# Patient Record
Sex: MALE | Race: OTHER | Employment: UNEMPLOYED | URBAN - NONMETROPOLITAN AREA
[De-identification: names, ages, dates, MRNs, and addresses within clinical notes are randomized per-mention and may not be internally consistent; named-entity substitution may affect disease eponyms.]

---

## 2018-07-10 ENCOUNTER — HOSPITAL ENCOUNTER (OUTPATIENT)
Dept: NON INVASIVE DIAGNOSTICS | Age: 48
Discharge: HOME | End: 2018-07-10
Payer: MEDICAID

## 2018-07-10 PROCEDURE — 93017 CV STRESS TEST TRACING ONLY: CPT

## 2018-08-13 ENCOUNTER — APPOINTMENT (OUTPATIENT)
Dept: NUCLEAR MEDICINE | Age: 48
End: 2018-08-13
Payer: MEDICAID

## 2018-08-13 ENCOUNTER — HOSPITAL ENCOUNTER (OUTPATIENT)
Dept: PULMONOLOGY | Age: 48
Discharge: HOME | End: 2018-08-13
Payer: MEDICAID

## 2018-08-13 ENCOUNTER — HOSPITAL ENCOUNTER (OUTPATIENT)
Dept: NON INVASIVE DIAGNOSTICS | Age: 48
Discharge: HOME | End: 2018-08-13
Payer: MEDICAID

## 2018-08-13 ENCOUNTER — APPOINTMENT (OUTPATIENT)
Dept: NON INVASIVE DIAGNOSTICS | Age: 48
End: 2018-08-13
Payer: MEDICAID

## 2018-08-13 LAB
LV EF: 60 %
LVEF MODALITY: NORMAL

## 2018-08-13 PROCEDURE — 94729 DIFFUSING CAPACITY: CPT | Performed by: INTERNAL MEDICINE

## 2018-08-13 PROCEDURE — 94060 EVALUATION OF WHEEZING: CPT

## 2018-08-13 PROCEDURE — 94060 EVALUATION OF WHEEZING: CPT | Performed by: INTERNAL MEDICINE

## 2018-08-13 PROCEDURE — 93306 TTE W/DOPPLER COMPLETE: CPT

## 2018-08-13 PROCEDURE — 6360000002 HC RX W HCPCS: Performed by: INTERNAL MEDICINE

## 2018-08-13 PROCEDURE — 94729 DIFFUSING CAPACITY: CPT

## 2018-08-13 PROCEDURE — 94726 PLETHYSMOGRAPHY LUNG VOLUMES: CPT

## 2018-08-13 RX ORDER — ALBUTEROL SULFATE 2.5 MG/3ML
2.5 SOLUTION RESPIRATORY (INHALATION) ONCE
Status: COMPLETED | OUTPATIENT
Start: 2018-08-13 | End: 2018-08-13

## 2018-08-13 RX ADMIN — ALBUTEROL SULFATE 2.5 MG: 2.5 SOLUTION RESPIRATORY (INHALATION) at 12:57

## 2019-08-27 ENCOUNTER — HOSPITAL ENCOUNTER (OUTPATIENT)
Age: 49
Discharge: HOME | End: 2019-08-29
Payer: MEDICAID

## 2019-08-27 ENCOUNTER — HOSPITAL ENCOUNTER (OUTPATIENT)
Dept: GENERAL RADIOLOGY | Age: 49
Discharge: HOME | End: 2019-08-29
Payer: MEDICAID

## 2019-08-27 DIAGNOSIS — M25.472 LEFT ANKLE SWELLING: ICD-10-CM

## 2019-08-27 DIAGNOSIS — M25.572 LEFT ANKLE PAIN, UNSPECIFIED CHRONICITY: ICD-10-CM

## 2019-08-27 PROCEDURE — 73610 X-RAY EXAM OF ANKLE: CPT

## 2022-12-14 ENCOUNTER — OFFICE VISIT (OUTPATIENT)
Dept: FAMILY MEDICINE CLINIC | Age: 52
End: 2022-12-14

## 2022-12-14 ENCOUNTER — HOSPITAL ENCOUNTER (OUTPATIENT)
Age: 52
Discharge: HOME | End: 2022-12-14
Payer: MEDICAID

## 2022-12-14 VITALS
SYSTOLIC BLOOD PRESSURE: 138 MMHG | HEIGHT: 66 IN | DIASTOLIC BLOOD PRESSURE: 92 MMHG | WEIGHT: 176.6 LBS | HEART RATE: 78 BPM | OXYGEN SATURATION: 97 % | RESPIRATION RATE: 20 BRPM | BODY MASS INDEX: 28.38 KG/M2

## 2022-12-14 DIAGNOSIS — E88.81 INSULIN RESISTANCE: ICD-10-CM

## 2022-12-14 DIAGNOSIS — Z53.20 COLON CANCER SCREENING DECLINED: ICD-10-CM

## 2022-12-14 DIAGNOSIS — E78.2 MIXED HYPERLIPIDEMIA: ICD-10-CM

## 2022-12-14 DIAGNOSIS — F51.01 PRIMARY INSOMNIA: ICD-10-CM

## 2022-12-14 DIAGNOSIS — I10 ESSENTIAL HYPERTENSION: Primary | ICD-10-CM

## 2022-12-14 DIAGNOSIS — K14.6 SORENESS OF TONGUE: ICD-10-CM

## 2022-12-14 DIAGNOSIS — I10 ESSENTIAL HYPERTENSION: ICD-10-CM

## 2022-12-14 DIAGNOSIS — R79.89 ELEVATED LFTS: ICD-10-CM

## 2022-12-14 DIAGNOSIS — Z00.00 GENERAL MEDICAL EXAM: ICD-10-CM

## 2022-12-14 DIAGNOSIS — R60.0 BILATERAL LOWER EXTREMITY EDEMA: ICD-10-CM

## 2022-12-14 PROCEDURE — 80061 LIPID PANEL: CPT

## 2022-12-14 PROCEDURE — 83036 HEMOGLOBIN GLYCOSYLATED A1C: CPT

## 2022-12-14 PROCEDURE — 80053 COMPREHEN METABOLIC PANEL: CPT

## 2022-12-14 PROCEDURE — 85025 COMPLETE CBC W/AUTO DIFF WBC: CPT

## 2022-12-14 PROCEDURE — 36415 COLL VENOUS BLD VENIPUNCTURE: CPT

## 2022-12-14 RX ORDER — ASPIRIN 81 MG/1
81 TABLET ORAL DAILY
COMMUNITY

## 2022-12-14 RX ORDER — LISINOPRIL 20 MG/1
20 TABLET ORAL DAILY
Qty: 30 TABLET | Refills: 5 | Status: SHIPPED | OUTPATIENT
Start: 2022-12-14

## 2022-12-14 RX ORDER — FUROSEMIDE 20 MG/1
20 TABLET ORAL DAILY
COMMUNITY

## 2022-12-14 RX ORDER — ATORVASTATIN CALCIUM 10 MG/1
10 TABLET, FILM COATED ORAL DAILY
COMMUNITY
End: 2022-12-14 | Stop reason: SDUPTHER

## 2022-12-14 RX ORDER — CHOLECALCIFEROL (VITAMIN D3) 125 MCG
5 CAPSULE ORAL NIGHTLY PRN
COMMUNITY

## 2022-12-14 RX ORDER — ATORVASTATIN CALCIUM 10 MG/1
10 TABLET, FILM COATED ORAL DAILY
Qty: 30 TABLET | Refills: 5 | Status: SHIPPED | OUTPATIENT
Start: 2022-12-14

## 2022-12-14 RX ORDER — CIMETIDINE 300 MG/1
300 TABLET, FILM COATED ORAL 4 TIMES DAILY
COMMUNITY

## 2022-12-14 SDOH — ECONOMIC STABILITY: FOOD INSECURITY: WITHIN THE PAST 12 MONTHS, THE FOOD YOU BOUGHT JUST DIDN'T LAST AND YOU DIDN'T HAVE MONEY TO GET MORE.: NEVER TRUE

## 2022-12-14 SDOH — ECONOMIC STABILITY: FOOD INSECURITY: WITHIN THE PAST 12 MONTHS, YOU WORRIED THAT YOUR FOOD WOULD RUN OUT BEFORE YOU GOT MONEY TO BUY MORE.: NEVER TRUE

## 2022-12-14 ASSESSMENT — ENCOUNTER SYMPTOMS
NAUSEA: 0
DIARRHEA: 0
VOMITING: 0
SINUS PAIN: 0
BACK PAIN: 0
WHEEZING: 0
SORE THROAT: 0
COUGH: 0
ABDOMINAL PAIN: 0

## 2022-12-14 ASSESSMENT — PATIENT HEALTH QUESTIONNAIRE - PHQ9
7. TROUBLE CONCENTRATING ON THINGS, SUCH AS READING THE NEWSPAPER OR WATCHING TELEVISION: 0
SUM OF ALL RESPONSES TO PHQ QUESTIONS 1-9: 0
5. POOR APPETITE OR OVEREATING: 0
SUM OF ALL RESPONSES TO PHQ QUESTIONS 1-9: 0
3. TROUBLE FALLING OR STAYING ASLEEP: 0
4. FEELING TIRED OR HAVING LITTLE ENERGY: 0
10. IF YOU CHECKED OFF ANY PROBLEMS, HOW DIFFICULT HAVE THESE PROBLEMS MADE IT FOR YOU TO DO YOUR WORK, TAKE CARE OF THINGS AT HOME, OR GET ALONG WITH OTHER PEOPLE: 0
8. MOVING OR SPEAKING SO SLOWLY THAT OTHER PEOPLE COULD HAVE NOTICED. OR THE OPPOSITE, BEING SO FIGETY OR RESTLESS THAT YOU HAVE BEEN MOVING AROUND A LOT MORE THAN USUAL: 0
2. FEELING DOWN, DEPRESSED OR HOPELESS: 0
9. THOUGHTS THAT YOU WOULD BE BETTER OFF DEAD, OR OF HURTING YOURSELF: 0
SUM OF ALL RESPONSES TO PHQ QUESTIONS 1-9: 0
SUM OF ALL RESPONSES TO PHQ QUESTIONS 1-9: 0
SUM OF ALL RESPONSES TO PHQ9 QUESTIONS 1 & 2: 0
1. LITTLE INTEREST OR PLEASURE IN DOING THINGS: 0
6. FEELING BAD ABOUT YOURSELF - OR THAT YOU ARE A FAILURE OR HAVE LET YOURSELF OR YOUR FAMILY DOWN: 0

## 2022-12-14 ASSESSMENT — SOCIAL DETERMINANTS OF HEALTH (SDOH): HOW HARD IS IT FOR YOU TO PAY FOR THE VERY BASICS LIKE FOOD, HOUSING, MEDICAL CARE, AND HEATING?: NOT HARD AT ALL

## 2022-12-14 NOTE — PROGRESS NOTES
HPI Notes    Name: Jackson Vann  : 1970         Chief Complaint:     Chief Complaint   Patient presents with    New Patient     Comes from [facility] office in [address] [place] [address] would like to establish new PCP    Hypertension    Cholesterol Problem    Oral Pain     States his tongue has been hurting denies any lesions        History of Present Illness:        HPI    This is a very nice 47year old man with PMH of hypertension, hyperlipidemia, insomnia presenting to establish care with new primary care provider. His former primary care provider was Mr. Kurt Re[facility] at [facility]. We will obtain records from that individual.    His hyperlipidemia is currently treated with atorvastatin 10 mg p.o. daily. Insomnia is currently treated with melatonin 5 mg p.o. nightly. Hypertension is currently treated utilizing Lasix 20 mg p.o. daily, which was also helping him treat his lower extremity edema. He had more or less began noticing severely hypertensive readings accidentally when he checked his blood pressure on his mother's device. He has since kept log of twice daily blood pressure readings and his quite grossly hypertensive for most of the day. He has no symptoms. Of note, recent laboratory studies are significant for elevated AST, ALT with the remainder of his liver function normal, as well as A1c of 6.3%. He has been lost approximately 30 pounds over the past 8 months and continues to improve on his diet to move towards a healthier weight for his height. One additional problem he wishes to discuss today is pain of his tongue. He describes this as a \"tickling soreness\"on both sides of the tongue. This has been ongoing for several months, and was actually treated with antibiotics from his former PCP. He does not notice any lesions on the tongue. He does smoke 3-4 cigarettes a day and this problem improved somewhat with changing brands of cigarettes.      I briefly discussed colon cancer screening with him today, including Cologuard as well as colon scope. He reports that he would prefer to screen only \"if there is a problem\". Past Medical History:     Past Medical History:   Diagnosis Date    Hyperlipidemia     Hypertension     Insomnia       Reviewed all health maintenance requirements and ordered appropriate tests  Health Maintenance Due   Topic Date Due    Lipids  Never done    Depression Screen  Never done    HIV screen  Never done    Hepatitis C screen  Never done    Diabetes screen  Never done    Colorectal Cancer Screen  Never done    Shingles vaccine (1 of 2) Never done    COVID-19 Vaccine (4 - Booster for Moderna series) 01/25/2022    Flu vaccine (1) 08/01/2022       Past Surgical History:     No past surgical history on file. Medications:       Prior to Admission medications    Medication Sig Start Date End Date Taking? Authorizing Provider   aspirin 81 MG EC tablet Take 81 mg by mouth daily   Yes Historical Provider, MD   cimetidine (TAGAMET) 300 MG tablet Take 300 mg by mouth 4 times daily   Yes Historical Provider, MD   furosemide (LASIX) 20 MG tablet Take 20 mg by mouth daily If needed   Yes Historical Provider, MD   melatonin 5 MG TABS tablet Take 5 mg by mouth nightly as needed   Yes Historical Provider, MD   lisinopril (PRINIVIL;ZESTRIL) 20 MG tablet Take 1 tablet by mouth daily 12/14/22  Yes Jamar Rowe DO   atorvastatin (LIPITOR) 10 MG tablet Take 1 tablet by mouth daily 12/14/22  Yes Jamar Rowe DO        Allergies:       Patient has no known allergies. Social History:     Tobacco:    reports that he has been smoking cigarettes. He has a 7.50 pack-year smoking history. He has never used smokeless tobacco.  Alcohol:      reports current alcohol use. Drug Use:  reports no history of drug use.     Family History:     Family History   Problem Relation Age of Onset    High Cholesterol Mother     Diabetes Mother     High Blood Pressure Mother     High Cholesterol Father     High Blood Pressure Father     Heart Attack Father        Review of Systems:       Review of Systems   Constitutional:  Negative for fever. HENT:  Negative for sinus pain, sneezing and sore throat. Tongue pain per HPI   Respiratory:  Negative for cough and wheezing. Cardiovascular:  Negative for chest pain. Gastrointestinal:  Negative for abdominal pain, diarrhea, nausea and vomiting. Musculoskeletal:  Negative for back pain. Skin:  Negative for rash. Hematological:  Negative for adenopathy. Psychiatric/Behavioral:  Negative for sleep disturbance. Physical Exam:     Vitals:  BP (!) 138/92 (Site: Left Upper Arm, Position: Sitting, Cuff Size: Medium Adult)   Pulse 78   Resp 20   Ht 5' 5.9\" (1.674 m)   Wt 176 lb 9.6 oz (80.1 kg)   SpO2 97%   BMI 28.59 kg/m²     Physical Exam  Vitals and nursing note reviewed. Constitutional:       General: He is not in acute distress. Appearance: Normal appearance. HENT:      Right Ear: Tympanic membrane, ear canal and external ear normal. There is no impacted cerumen. Left Ear: Tympanic membrane, ear canal and external ear normal. There is no impacted cerumen. Nose: Nose normal. No congestion. Mouth/Throat:      Mouth: Mucous membranes are moist.      Pharynx: Oropharynx is clear. No oropharyngeal exudate. Comments: Overall oral examination is the exception of discolored teeth, the left lateral aspect of his tongue near the tip has a small hypopigmented area visible in the photograph. This area burns when palpated with a cotton swab. Eyes:      General: No scleral icterus. Conjunctiva/sclera: Conjunctivae normal.      Pupils: Pupils are equal, round, and reactive to light. Cardiovascular:      Rate and Rhythm: Normal rate and regular rhythm. Pulses: Normal pulses. Heart sounds: Normal heart sounds. No murmur heard.   Pulmonary:      Effort: Pulmonary effort is normal. No respiratory distress. Breath sounds: Normal breath sounds. No wheezing or rhonchi. Abdominal:      General: Abdomen is flat. Bowel sounds are normal.      Palpations: Abdomen is soft. Tenderness: There is no abdominal tenderness. Musculoskeletal:         General: No swelling or tenderness. Normal range of motion. Cervical back: Normal range of motion and neck supple. No rigidity. No muscular tenderness. Lymphadenopathy:      Cervical: No cervical adenopathy. Skin:     General: Skin is warm and dry. Capillary Refill: Capillary refill takes less than 2 seconds. Neurological:      General: No focal deficit present. Mental Status: He is alert and oriented to person, place, and time. Mental status is at baseline. Psychiatric:         Mood and Affect: Mood normal.         Behavior: Behavior normal.         Thought Content: Thought content normal.               Data:     No results found for: NA, K, CL, CO2, BUN, CREATININE, GLUCOSE, PROT, LABALBU, BILITOT, ALKPHOS, AST, ALT  No results found for: WBC, RBC, HGB, HCT, MCV, MCH, MCHC, RDW, PLT, MPV  No results found for: TSH  No results found for: CHOL, LDL, HDL, PSA, LABA1C       Assessment & Plan      Diagnosis Orders   1. Essential hypertension  lisinopril (PRINIVIL;ZESTRIL) 20 MG tablet    Comprehensive Metabolic Panel    CBC with Auto Differential      2. Mixed hyperlipidemia  atorvastatin (LIPITOR) 10 MG tablet    Lipid Panel      3. Primary insomnia        4. Bilateral lower extremity edema        5. Elevated LFTs  Comprehensive Metabolic Panel      6. Soreness of tongue        7. Insulin resistance  Hemoglobin A1C      8. General medical exam        9. Colon cancer screening declined            Continue furosemide, reevaluate CMP, CBC, start lisinopril 20 mg p.o. daily. Continue to measure blood pressure twice daily, reevaluate in the office in 2 weeks. \   The patient I had a long discussion about starting lisinopril.   We discussed its mechanism of action, intended goals, adverse effects, as well as common side effects. They were able to verbalize understanding, and repeat plan back to me. 2.  Check lipid panel, continue atorvastatin. You. I would recommend that he use melatonin and valerian root extract. 4.  Continue furosemide. 5.  We will reevaluate with CMP today, likely due to hepatic steatosis given that he had until recently been obese. 6.  He is a light tobacco smoker, will continue to monitor for signs of oral cancer. There are no lesions which are particularly concerning today. This may be stomatitis or transient lingual papillitis. 7.  We will reevaluate hemoglobin A1c today. 9.  I would recommend that he undergo screening with Cologuard, or colon cancer and we will support him with whichever one he chooses when he is ready to undergo screening. Follow up in 2 weeks. Completed Refills   Requested Prescriptions     Signed Prescriptions Disp Refills    lisinopril (PRINIVIL;ZESTRIL) 20 MG tablet 30 tablet 5     Sig: Take 1 tablet by mouth daily    atorvastatin (LIPITOR) 10 MG tablet 30 tablet 5     Sig: Take 1 tablet by mouth daily     Return in about 2 weeks (around 12/28/2022) for HTN.   Orders Placed This Encounter   Medications    lisinopril (PRINIVIL;ZESTRIL) 20 MG tablet     Sig: Take 1 tablet by mouth daily     Dispense:  30 tablet     Refill:  5    atorvastatin (LIPITOR) 10 MG tablet     Sig: Take 1 tablet by mouth daily     Dispense:  30 tablet     Refill:  5     Orders Placed This Encounter   Procedures    Comprehensive Metabolic Panel     Standing Status:   Future     Number of Occurrences:   1     Standing Expiration Date:   12/14/2023    Lipid Panel     Standing Status:   Future     Number of Occurrences:   1     Standing Expiration Date:   12/14/2023     Order Specific Question:   Is Patient Fasting?/# of Hours     Answer:   yes/10    CBC with Auto Differential     Standing Status: Future     Number of Occurrences:   1     Standing Expiration Date:   12/14/2023    Hemoglobin A1C     Standing Status:   Future     Number of Occurrences:   1     Standing Expiration Date:   12/14/2023         Patient Instructions     SURVEY:    You may be receiving a survey from [employer] regarding your visit today. Please complete the survey to enable us to provide the highest quality of care to you and your family. If you cannot score us a very good on any question, please call the office to discuss how we could of made your experience a very good one. Thank you.       Clinical Care Team:     Dr. Mango Merrill, [facility]      ClericalTeam:     [id number] [place] [address]   Electronically signed by Bridgette Leyden, DO on 12/14/2022 at 11:22 AM           Completed Refills   Requested Prescriptions     Signed Prescriptions Disp Refills    lisinopril (PRINIVIL;ZESTRIL) 20 MG tablet 30 tablet 5     Sig: Take 1 tablet by mouth daily    atorvastatin (LIPITOR) 10 MG tablet 30 tablet 5     Sig: Take 1 tablet by mouth daily

## 2022-12-28 ENCOUNTER — OFFICE VISIT (OUTPATIENT)
Dept: FAMILY MEDICINE CLINIC | Age: 52
End: 2022-12-28
Payer: MEDICAID

## 2022-12-28 VITALS
RESPIRATION RATE: 20 BRPM | HEIGHT: 66 IN | BODY MASS INDEX: 28.19 KG/M2 | DIASTOLIC BLOOD PRESSURE: 80 MMHG | WEIGHT: 175.4 LBS | HEART RATE: 70 BPM | SYSTOLIC BLOOD PRESSURE: 110 MMHG | OXYGEN SATURATION: 98 %

## 2022-12-28 DIAGNOSIS — H57.89 REDNESS OF BOTH EYES: ICD-10-CM

## 2022-12-28 DIAGNOSIS — H04.123 DRY EYES, BILATERAL: ICD-10-CM

## 2022-12-28 DIAGNOSIS — H53.8 BLURRY VISION: ICD-10-CM

## 2022-12-28 DIAGNOSIS — E78.2 MIXED HYPERLIPIDEMIA: ICD-10-CM

## 2022-12-28 DIAGNOSIS — I10 ESSENTIAL HYPERTENSION: Primary | ICD-10-CM

## 2022-12-28 PROCEDURE — 3017F COLORECTAL CA SCREEN DOC REV: CPT | Performed by: STUDENT IN AN ORGANIZED HEALTH CARE EDUCATION/TRAINING PROGRAM

## 2022-12-28 PROCEDURE — 4004F PT TOBACCO SCREEN RCVD TLK: CPT | Performed by: STUDENT IN AN ORGANIZED HEALTH CARE EDUCATION/TRAINING PROGRAM

## 2022-12-28 PROCEDURE — G8484 FLU IMMUNIZE NO ADMIN: HCPCS | Performed by: STUDENT IN AN ORGANIZED HEALTH CARE EDUCATION/TRAINING PROGRAM

## 2022-12-28 PROCEDURE — 99213 OFFICE O/P EST LOW 20 MIN: CPT | Performed by: STUDENT IN AN ORGANIZED HEALTH CARE EDUCATION/TRAINING PROGRAM

## 2022-12-28 PROCEDURE — G8419 CALC BMI OUT NRM PARAM NOF/U: HCPCS | Performed by: STUDENT IN AN ORGANIZED HEALTH CARE EDUCATION/TRAINING PROGRAM

## 2022-12-28 PROCEDURE — 3074F SYST BP LT 130 MM HG: CPT | Performed by: STUDENT IN AN ORGANIZED HEALTH CARE EDUCATION/TRAINING PROGRAM

## 2022-12-28 PROCEDURE — G8427 DOCREV CUR MEDS BY ELIG CLIN: HCPCS | Performed by: STUDENT IN AN ORGANIZED HEALTH CARE EDUCATION/TRAINING PROGRAM

## 2022-12-28 PROCEDURE — 3079F DIAST BP 80-89 MM HG: CPT | Performed by: STUDENT IN AN ORGANIZED HEALTH CARE EDUCATION/TRAINING PROGRAM

## 2022-12-28 RX ORDER — AZELASTINE HYDROCHLORIDE 0.5 MG/ML
1 SOLUTION/ DROPS OPHTHALMIC 2 TIMES DAILY
Qty: 6 ML | Refills: 1 | Status: SHIPPED | OUTPATIENT
Start: 2022-12-28 | End: 2023-01-27

## 2022-12-28 RX ORDER — LISINOPRIL 20 MG/1
40 TABLET ORAL DAILY
Qty: 60 TABLET | Refills: 5 | Status: SHIPPED | OUTPATIENT
Start: 2022-12-28

## 2022-12-28 RX ORDER — ATORVASTATIN CALCIUM 10 MG/1
10 TABLET, FILM COATED ORAL DAILY
Qty: 30 TABLET | Refills: 5 | Status: SHIPPED | OUTPATIENT
Start: 2022-12-28

## 2022-12-28 RX ORDER — FUROSEMIDE 20 MG/1
20 TABLET ORAL DAILY
Qty: 60 TABLET | Refills: 5 | Status: SHIPPED | OUTPATIENT
Start: 2022-12-28

## 2022-12-28 ASSESSMENT — ENCOUNTER SYMPTOMS
EYE ITCHING: 1
VOMITING: 0
COUGH: 0
SORE THROAT: 0
DIARRHEA: 0
NAUSEA: 0
EYE REDNESS: 1
SINUS PAIN: 0
BACK PAIN: 0
WHEEZING: 0
ABDOMINAL PAIN: 0

## 2022-12-28 NOTE — PROGRESS NOTES
HPI Notes    Name: Juan M Greenfield  : 1970         Chief Complaint:     Chief Complaint   Patient presents with    Hypertension     2 week follow up HTN has been logging his BP states it is all over the place     Eye Problem     Red eyes having hard time seeing has been to the eye doctor states they just give him new glassses       History of Present Illness:      HPI    This is a very nice 47year old man presenting for reevaluation of HTN. He has measured BP BID x14 days and presents with his recorded values. He reports that \"it is all over the place\". He continues to be adherent to taking furosemide and lisinopril, the latter of which is causing no side effects or adverse effects. Additionally, he is reporting newer onset blurry vision and red eyes over the past 18 months. He did go to his optometrist (Dr. Radha Santana) about five months ago and they gave him new glasses which are very helpful in correcting his vision. His main question is why is his vision blurry without the glasses. His eyes feel constantly \"tired\" and \"heavy\". He sees no scintillating scotoma or other visual field defect. Past Medical History:     Past Medical History:   Diagnosis Date    Hyperlipidemia     Hypertension     Insomnia       Reviewed all health maintenance requirements and ordered appropriate tests  Health Maintenance Due   Topic Date Due    HIV screen  Never done    Hepatitis C screen  Never done    Colorectal Cancer Screen  Never done    Shingles vaccine (1 of 2) Never done    COVID-19 Vaccine (4 - Booster for Moderna series) 2022    Flu vaccine (1) 2022       Past Surgical History:     No past surgical history on file. Medications:       Prior to Admission medications    Medication Sig Start Date End Date Taking?  Authorizing Provider   lisinopril (PRINIVIL;ZESTRIL) 20 MG tablet Take 2 tablets by mouth daily 22  Yes Kael German DO   azelastine (OPTIVAR) 0.05 % ophthalmic solution Place 1 drop into both eyes 2 times daily 12/28/22 1/27/23 Yes Quilla Spatz Olewiler, DO   furosemide (LASIX) 20 MG tablet Take 1 tablet by mouth daily If needed 12/28/22  Yes Ja Robertson DO   atorvastatin (LIPITOR) 10 MG tablet Take 1 tablet by mouth daily 12/28/22  Yes Quilla Spatz Olewiler, DO   aspirin 81 MG EC tablet Take 81 mg by mouth daily   Yes Historical Provider, MD   cimetidine (TAGAMET) 300 MG tablet Take 300 mg by mouth 4 times daily   Yes Historical Provider, MD   melatonin 5 MG TABS tablet Take 5 mg by mouth nightly as needed   Yes Historical Provider, MD        Allergies:       Patient has no known allergies. Social History:     Tobacco:    reports that he has been smoking cigarettes. He has a 7.50 pack-year smoking history. He has never used smokeless tobacco.  Alcohol:      reports current alcohol use. Drug Use:  reports no history of drug use. Family History:     Family History   Problem Relation Age of Onset    High Cholesterol Mother     Diabetes Mother     High Blood Pressure Mother     High Cholesterol Father     High Blood Pressure Father     Heart Attack Father        Review of Systems:         Review of Systems   Constitutional:  Negative for fever. HENT:  Negative for sinus pain, sneezing and sore throat. Eyes:  Positive for redness, itching and visual disturbance. Respiratory:  Negative for cough and wheezing. Cardiovascular:  Negative for chest pain. Gastrointestinal:  Negative for abdominal pain, diarrhea, nausea and vomiting. Musculoskeletal:  Negative for back pain. Skin:  Negative for rash. Hematological:  Negative for adenopathy. Psychiatric/Behavioral:  Negative for sleep disturbance. Physical Exam:     Vitals:  /80 (Site: Right Upper Arm, Position: Sitting, Cuff Size: Medium Adult)   Pulse 70   Resp 20   Ht 5' 5.9\" (1.674 m)   Wt 175 lb 6.4 oz (79.6 kg)   SpO2 98%   BMI 28.40 kg/m²       Physical Exam  Vitals and nursing note reviewed. Constitutional:       General: He is not in acute distress. Appearance: Normal appearance. He is obese. Eyes:      Comments: Sclera injected bilaterally eyes appear dry   Cardiovascular:      Rate and Rhythm: Normal rate and regular rhythm. Pulses: Normal pulses. Heart sounds: Normal heart sounds. No murmur heard. Pulmonary:      Effort: Pulmonary effort is normal. No respiratory distress. Breath sounds: Normal breath sounds. No wheezing or rhonchi. Musculoskeletal:         General: No swelling or tenderness. Normal range of motion. Skin:     General: Skin is warm and dry. Capillary Refill: Capillary refill takes less than 2 seconds. Neurological:      General: No focal deficit present. Mental Status: He is alert and oriented to person, place, and time. Mental status is at baseline. Psychiatric:         Mood and Affect: Mood normal.         Behavior: Behavior normal.         Thought Content:  Thought content normal.       Data:     Lab Results   Component Value Date/Time     12/14/2022 09:26 AM    K 4.3 12/14/2022 09:26 AM    CL 96 12/14/2022 09:26 AM    CO2 24 12/14/2022 09:26 AM    BUN 4 12/14/2022 09:26 AM    CREATININE 0.64 12/14/2022 09:26 AM    GLUCOSE 93 12/14/2022 09:26 AM    PROT 7.1 12/14/2022 09:26 AM    LABALBU 4.5 12/14/2022 09:26 AM    BILITOT 0.6 12/14/2022 09:26 AM    ALKPHOS 70 12/14/2022 09:26 AM    AST 51 12/14/2022 09:26 AM    ALT 54 12/14/2022 09:26 AM     Lab Results   Component Value Date/Time    WBC 5.4 12/14/2022 09:26 AM    RBC 5.24 12/14/2022 09:26 AM    HGB 15.8 12/14/2022 09:26 AM    HCT 46.7 12/14/2022 09:26 AM    MCV 89.0 12/14/2022 09:26 AM    MCH 30.2 12/14/2022 09:26 AM    MCHC 34.0 12/14/2022 09:26 AM    RDW 15.9 12/14/2022 09:26 AM     12/14/2022 09:26 AM     No results found for: TSH  Lab Results   Component Value Date/Time    CHOL 145 12/14/2022 09:26 AM    HDL 71 12/14/2022 09:26 AM    LABA1C 5.8 12/14/2022 09:26 AM tablet by mouth daily     Dispense:  30 tablet     Refill:  5     No orders of the defined types were placed in this encounter. There are no Patient Instructions on file for this visit.     Electronically signed by Satinder Spears DO on 12/28/2022 at 8:54 AM           Completed Refills   Requested Prescriptions     Signed Prescriptions Disp Refills    lisinopril (PRINIVIL;ZESTRIL) 20 MG tablet 60 tablet 5     Sig: Take 2 tablets by mouth daily    azelastine (OPTIVAR) 0.05 % ophthalmic solution 6 mL 1     Sig: Place 1 drop into both eyes 2 times daily    furosemide (LASIX) 20 MG tablet 60 tablet 5     Sig: Take 1 tablet by mouth daily If needed    atorvastatin (LIPITOR) 10 MG tablet 30 tablet 5     Sig: Take 1 tablet by mouth daily

## 2023-02-07 ENCOUNTER — APPOINTMENT (OUTPATIENT)
Dept: GENERAL RADIOLOGY | Age: 53
DRG: 199 | End: 2023-02-07
Payer: MEDICAID

## 2023-02-07 ENCOUNTER — OFFICE VISIT (OUTPATIENT)
Dept: FAMILY MEDICINE CLINIC | Age: 53
End: 2023-02-07
Payer: MEDICAID

## 2023-02-07 ENCOUNTER — HOSPITAL ENCOUNTER (OUTPATIENT)
Age: 53
Discharge: HOME | DRG: 199 | End: 2023-02-07
Payer: MEDICAID

## 2023-02-07 ENCOUNTER — HOSPITAL ENCOUNTER (INPATIENT)
Age: 53
LOS: 1 days | Discharge: HOME | DRG: 199 | End: 2023-02-08
Attending: EMERGENCY MEDICINE | Admitting: INTERNAL MEDICINE
Payer: MEDICAID

## 2023-02-07 VITALS
SYSTOLIC BLOOD PRESSURE: 166 MMHG | BODY MASS INDEX: 28.36 KG/M2 | HEART RATE: 62 BPM | RESPIRATION RATE: 20 BRPM | HEIGHT: 65 IN | WEIGHT: 170.2 LBS | DIASTOLIC BLOOD PRESSURE: 98 MMHG

## 2023-02-07 DIAGNOSIS — R07.89 CHEST DISCOMFORT: ICD-10-CM

## 2023-02-07 DIAGNOSIS — I10 ESSENTIAL HYPERTENSION: ICD-10-CM

## 2023-02-07 DIAGNOSIS — H65.92 OME (OTITIS MEDIA WITH EFFUSION), LEFT: ICD-10-CM

## 2023-02-07 DIAGNOSIS — H53.8 BLURRY VISION: ICD-10-CM

## 2023-02-07 DIAGNOSIS — I16.0 HYPERTENSIVE URGENCY: ICD-10-CM

## 2023-02-07 DIAGNOSIS — H60.8X2 CHRONIC ECZEMATOUS OTITIS EXTERNA OF LEFT EAR: ICD-10-CM

## 2023-02-07 DIAGNOSIS — R07.9 CHEST PAIN, UNSPECIFIED TYPE: Primary | ICD-10-CM

## 2023-02-07 DIAGNOSIS — H92.02 LEFT EAR PAIN: Primary | ICD-10-CM

## 2023-02-07 DIAGNOSIS — F51.01 PRIMARY INSOMNIA: ICD-10-CM

## 2023-02-07 DIAGNOSIS — I10 PRIMARY HYPERTENSION: ICD-10-CM

## 2023-02-07 DIAGNOSIS — H93.8X2 EAR FULLNESS, LEFT: ICD-10-CM

## 2023-02-07 LAB
ABSOLUTE EOS #: 0.2 K/UL (ref 0–0.4)
ABSOLUTE LYMPH #: 0.8 K/UL (ref 1–4.8)
ABSOLUTE MONO #: 0.7 K/UL (ref 0–1)
ALBUMIN SERPL-MCNC: 4.5 G/DL (ref 3.5–5.2)
ALP SERPL-CCNC: 72 U/L (ref 40–129)
ALT SERPL-CCNC: 44 U/L (ref 5–41)
ANION GAP SERPL CALCULATED.3IONS-SCNC: 14 MMOL/L (ref 9–17)
ANION GAP SERPL CALCULATED.3IONS-SCNC: 14 MMOL/L (ref 9–17)
AST SERPL-CCNC: 51 U/L
BASOPHILS # BLD: 1 % (ref 0–2)
BASOPHILS ABSOLUTE: 0.1 K/UL (ref 0–0.2)
BILIRUB DIRECT SERPL-MCNC: 0.2 MG/DL
BILIRUB INDIRECT SERPL-MCNC: 0.4 MG/DL (ref 0–1)
BILIRUB SERPL-MCNC: 0.6 MG/DL (ref 0.3–1.2)
BUN SERPL-MCNC: 3 MG/DL (ref 6–20)
BUN SERPL-MCNC: 3 MG/DL (ref 6–20)
BUN/CREAT BLD: 5 (ref 9–20)
BUN/CREAT BLD: 5 (ref 9–20)
CALCIUM SERPL-MCNC: 9.1 MG/DL (ref 8.6–10.4)
CALCIUM SERPL-MCNC: 9.2 MG/DL (ref 8.6–10.4)
CHLORIDE SERPL-SCNC: 89 MMOL/L (ref 98–107)
CHLORIDE SERPL-SCNC: 93 MMOL/L (ref 98–107)
CO2 SERPL-SCNC: 21 MMOL/L (ref 20–31)
CO2 SERPL-SCNC: 23 MMOL/L (ref 20–31)
CREAT SERPL-MCNC: 0.58 MG/DL (ref 0.7–1.2)
CREAT SERPL-MCNC: 0.64 MG/DL (ref 0.7–1.2)
DIFFERENTIAL TYPE: YES
EOSINOPHILS RELATIVE PERCENT: 3 % (ref 0–5)
ETHANOL PERCENT: <0.01 %
ETHANOL: <10 MG/DL
GFR SERPL CREATININE-BSD FRML MDRD: >60 ML/MIN/1.73M2
GFR SERPL CREATININE-BSD FRML MDRD: >60 ML/MIN/1.73M2
GLUCOSE SERPL-MCNC: 101 MG/DL (ref 70–99)
GLUCOSE SERPL-MCNC: 99 MG/DL (ref 70–99)
HCT VFR BLD AUTO: 44.5 % (ref 41–53)
HGB BLD-MCNC: 15.4 G/DL (ref 13.5–17.5)
LYMPHOCYTES # BLD: 10 % (ref 13–44)
MAGNESIUM SERPL-MCNC: 2.2 MG/DL (ref 1.6–2.6)
MCH RBC QN AUTO: 30.9 PG (ref 26–34)
MCHC RBC AUTO-ENTMCNC: 34.6 G/DL (ref 31–37)
MCV RBC AUTO: 89.2 FL (ref 80–100)
MONOCYTES # BLD: 9 % (ref 5–9)
PDW BLD-RTO: 14.4 % (ref 12.1–15.2)
PLATELET # BLD AUTO: 201 K/UL (ref 140–450)
POTASSIUM SERPL-SCNC: 3.7 MMOL/L (ref 3.7–5.3)
POTASSIUM SERPL-SCNC: 4.1 MMOL/L (ref 3.7–5.3)
PROT SERPL-MCNC: 7.1 G/DL (ref 6.4–8.3)
RBC # BLD: 4.99 M/UL (ref 4.5–5.9)
SARS-COV-2 RDRP RESP QL NAA+PROBE: NOT DETECTED
SEG NEUTROPHILS: 77 % (ref 39–75)
SEGMENTED NEUTROPHILS ABSOLUTE COUNT: 5.7 K/UL (ref 2.1–6.5)
SODIUM SERPL-SCNC: 126 MMOL/L (ref 135–144)
SODIUM SERPL-SCNC: 128 MMOL/L (ref 135–144)
SPECIMEN DESCRIPTION: NORMAL
TROPONIN I SERPL DL<=0.01 NG/ML-MCNC: 37 NG/L (ref 0–22)
TROPONIN I SERPL DL<=0.01 NG/ML-MCNC: 37 NG/L (ref 0–22)
TROPONIN I SERPL DL<=0.01 NG/ML-MCNC: 55 NG/L (ref 0–22)
TROPONIN I SERPL DL<=0.01 NG/ML-MCNC: 58 NG/L (ref 0–22)
TSH SERPL-ACNC: 0.92 UIU/ML (ref 0.3–5)
WBC # BLD AUTO: 7.4 K/UL (ref 3.5–11)

## 2023-02-07 PROCEDURE — 3080F DIAST BP >= 90 MM HG: CPT | Performed by: STUDENT IN AN ORGANIZED HEALTH CARE EDUCATION/TRAINING PROGRAM

## 2023-02-07 PROCEDURE — G8427 DOCREV CUR MEDS BY ELIG CLIN: HCPCS | Performed by: STUDENT IN AN ORGANIZED HEALTH CARE EDUCATION/TRAINING PROGRAM

## 2023-02-07 PROCEDURE — 85025 COMPLETE CBC W/AUTO DIFF WBC: CPT

## 2023-02-07 PROCEDURE — 94761 N-INVAS EAR/PLS OXIMETRY MLT: CPT

## 2023-02-07 PROCEDURE — 80076 HEPATIC FUNCTION PANEL: CPT

## 2023-02-07 PROCEDURE — 87635 SARS-COV-2 COVID-19 AMP PRB: CPT

## 2023-02-07 PROCEDURE — 83835 ASSAY OF METANEPHRINES: CPT

## 2023-02-07 PROCEDURE — 6370000000 HC RX 637 (ALT 250 FOR IP): Performed by: INTERNAL MEDICINE

## 2023-02-07 PROCEDURE — 83735 ASSAY OF MAGNESIUM: CPT

## 2023-02-07 PROCEDURE — G0480 DRUG TEST DEF 1-7 CLASSES: HCPCS

## 2023-02-07 PROCEDURE — 4004F PT TOBACCO SCREEN RCVD TLK: CPT | Performed by: STUDENT IN AN ORGANIZED HEALTH CARE EDUCATION/TRAINING PROGRAM

## 2023-02-07 PROCEDURE — 82533 TOTAL CORTISOL: CPT

## 2023-02-07 PROCEDURE — 36415 COLL VENOUS BLD VENIPUNCTURE: CPT

## 2023-02-07 PROCEDURE — 6370000000 HC RX 637 (ALT 250 FOR IP): Performed by: EMERGENCY MEDICINE

## 2023-02-07 PROCEDURE — 84484 ASSAY OF TROPONIN QUANT: CPT

## 2023-02-07 PROCEDURE — C9803 HOPD COVID-19 SPEC COLLECT: HCPCS

## 2023-02-07 PROCEDURE — 2500000003 HC RX 250 WO HCPCS: Performed by: INTERNAL MEDICINE

## 2023-02-07 PROCEDURE — 1200000000 HC SEMI PRIVATE

## 2023-02-07 PROCEDURE — 82088 ASSAY OF ALDOSTERONE: CPT

## 2023-02-07 PROCEDURE — 99214 OFFICE O/P EST MOD 30 MIN: CPT | Performed by: STUDENT IN AN ORGANIZED HEALTH CARE EDUCATION/TRAINING PROGRAM

## 2023-02-07 PROCEDURE — 99285 EMERGENCY DEPT VISIT HI MDM: CPT

## 2023-02-07 PROCEDURE — 2580000003 HC RX 258: Performed by: INTERNAL MEDICINE

## 2023-02-07 PROCEDURE — 6360000002 HC RX W HCPCS: Performed by: INTERNAL MEDICINE

## 2023-02-07 PROCEDURE — 84244 ASSAY OF RENIN: CPT

## 2023-02-07 PROCEDURE — 93005 ELECTROCARDIOGRAM TRACING: CPT

## 2023-02-07 PROCEDURE — 71045 X-RAY EXAM CHEST 1 VIEW: CPT

## 2023-02-07 PROCEDURE — 3077F SYST BP >= 140 MM HG: CPT | Performed by: STUDENT IN AN ORGANIZED HEALTH CARE EDUCATION/TRAINING PROGRAM

## 2023-02-07 PROCEDURE — 84443 ASSAY THYROID STIM HORMONE: CPT

## 2023-02-07 PROCEDURE — 80048 BASIC METABOLIC PNL TOTAL CA: CPT

## 2023-02-07 PROCEDURE — G8484 FLU IMMUNIZE NO ADMIN: HCPCS | Performed by: STUDENT IN AN ORGANIZED HEALTH CARE EDUCATION/TRAINING PROGRAM

## 2023-02-07 PROCEDURE — 3017F COLORECTAL CA SCREEN DOC REV: CPT | Performed by: STUDENT IN AN ORGANIZED HEALTH CARE EDUCATION/TRAINING PROGRAM

## 2023-02-07 PROCEDURE — G8419 CALC BMI OUT NRM PARAM NOF/U: HCPCS | Performed by: STUDENT IN AN ORGANIZED HEALTH CARE EDUCATION/TRAINING PROGRAM

## 2023-02-07 PROCEDURE — 93005 ELECTROCARDIOGRAM TRACING: CPT | Performed by: EMERGENCY MEDICINE

## 2023-02-07 RX ORDER — ACETAMINOPHEN 325 MG/1
650 TABLET ORAL EVERY 6 HOURS PRN
Status: DISCONTINUED | OUTPATIENT
Start: 2023-02-07 | End: 2023-02-08 | Stop reason: HOSPADM

## 2023-02-07 RX ORDER — NITROGLYCERIN 0.4 MG/1
0.4 TABLET SUBLINGUAL EVERY 5 MIN PRN
OUTPATIENT
Start: 2023-02-07 | End: 2023-02-07

## 2023-02-07 RX ORDER — ATORVASTATIN CALCIUM 10 MG/1
10 TABLET, FILM COATED ORAL DAILY
Status: DISCONTINUED | OUTPATIENT
Start: 2023-02-07 | End: 2023-02-08 | Stop reason: HOSPADM

## 2023-02-07 RX ORDER — ACETAMINOPHEN 650 MG/1
650 SUPPOSITORY RECTAL EVERY 6 HOURS PRN
Status: DISCONTINUED | OUTPATIENT
Start: 2023-02-07 | End: 2023-02-08 | Stop reason: HOSPADM

## 2023-02-07 RX ORDER — HYDROCHLOROTHIAZIDE 25 MG/1
25 TABLET ORAL DAILY
Status: DISCONTINUED | OUTPATIENT
Start: 2023-02-07 | End: 2023-02-08 | Stop reason: HOSPADM

## 2023-02-07 RX ORDER — SODIUM CHLORIDE 0.9 % (FLUSH) 0.9 %
5-40 SYRINGE (ML) INJECTION PRN
Status: DISCONTINUED | OUTPATIENT
Start: 2023-02-07 | End: 2023-02-08 | Stop reason: HOSPADM

## 2023-02-07 RX ORDER — ENOXAPARIN SODIUM 100 MG/ML
40 INJECTION SUBCUTANEOUS DAILY
Status: DISCONTINUED | OUTPATIENT
Start: 2023-02-07 | End: 2023-02-08 | Stop reason: HOSPADM

## 2023-02-07 RX ORDER — DOXAZOSIN 2 MG/1
2 TABLET ORAL DAILY
Status: DISCONTINUED | OUTPATIENT
Start: 2023-02-07 | End: 2023-02-08 | Stop reason: HOSPADM

## 2023-02-07 RX ORDER — SODIUM CHLORIDE 0.9 % (FLUSH) 0.9 %
5-40 SYRINGE (ML) INJECTION EVERY 12 HOURS SCHEDULED
Status: DISCONTINUED | OUTPATIENT
Start: 2023-02-07 | End: 2023-02-08 | Stop reason: HOSPADM

## 2023-02-07 RX ORDER — ASPIRIN 81 MG/1
81 TABLET ORAL DAILY
Status: DISCONTINUED | OUTPATIENT
Start: 2023-02-07 | End: 2023-02-08 | Stop reason: HOSPADM

## 2023-02-07 RX ORDER — CLONIDINE HYDROCHLORIDE 0.1 MG/1
0.2 TABLET ORAL ONCE
Status: COMPLETED | OUTPATIENT
Start: 2023-02-07 | End: 2023-02-07

## 2023-02-07 RX ORDER — FAMOTIDINE 20 MG/1
20 TABLET, FILM COATED ORAL 2 TIMES DAILY
Status: DISCONTINUED | OUTPATIENT
Start: 2023-02-07 | End: 2023-02-08 | Stop reason: HOSPADM

## 2023-02-07 RX ORDER — POLYETHYLENE GLYCOL 3350 17 G/17G
17 POWDER, FOR SOLUTION ORAL DAILY PRN
Status: DISCONTINUED | OUTPATIENT
Start: 2023-02-07 | End: 2023-02-08 | Stop reason: HOSPADM

## 2023-02-07 RX ORDER — LISINOPRIL AND HYDROCHLOROTHIAZIDE 20; 12.5 MG/1; MG/1
2 TABLET ORAL DAILY
Status: DISCONTINUED | OUTPATIENT
Start: 2023-02-07 | End: 2023-02-07

## 2023-02-07 RX ORDER — ONDANSETRON 4 MG/1
4 TABLET, ORALLY DISINTEGRATING ORAL EVERY 8 HOURS PRN
Status: DISCONTINUED | OUTPATIENT
Start: 2023-02-07 | End: 2023-02-08 | Stop reason: HOSPADM

## 2023-02-07 RX ORDER — AMLODIPINE BESYLATE 10 MG/1
10 TABLET ORAL DAILY
Status: DISCONTINUED | OUTPATIENT
Start: 2023-02-07 | End: 2023-02-08

## 2023-02-07 RX ORDER — CHOLECALCIFEROL (VITAMIN D3) 125 MCG
5 CAPSULE ORAL NIGHTLY PRN
Status: DISCONTINUED | OUTPATIENT
Start: 2023-02-07 | End: 2023-02-08 | Stop reason: HOSPADM

## 2023-02-07 RX ORDER — AMOXICILLIN 500 MG/1
500 CAPSULE ORAL 2 TIMES DAILY
Qty: 14 CAPSULE | Refills: 0 | Status: SHIPPED | OUTPATIENT
Start: 2023-02-07 | End: 2023-02-14

## 2023-02-07 RX ORDER — FUROSEMIDE 20 MG/1
20 TABLET ORAL DAILY
Status: DISCONTINUED | OUTPATIENT
Start: 2023-02-07 | End: 2023-02-08

## 2023-02-07 RX ORDER — AMOXICILLIN 500 MG/1
500 CAPSULE ORAL 3 TIMES DAILY
Status: DISCONTINUED | OUTPATIENT
Start: 2023-02-07 | End: 2023-02-08 | Stop reason: HOSPADM

## 2023-02-07 RX ORDER — SODIUM CHLORIDE 9 MG/ML
500 INJECTION, SOLUTION INTRAVENOUS CONTINUOUS PRN
OUTPATIENT
Start: 2023-02-07 | End: 2023-02-07

## 2023-02-07 RX ORDER — ALBUTEROL SULFATE 90 UG/1
2 AEROSOL, METERED RESPIRATORY (INHALATION) PRN
OUTPATIENT
Start: 2023-02-07 | End: 2023-02-07

## 2023-02-07 RX ORDER — ONDANSETRON 2 MG/ML
4 INJECTION INTRAMUSCULAR; INTRAVENOUS EVERY 6 HOURS PRN
Status: DISCONTINUED | OUTPATIENT
Start: 2023-02-07 | End: 2023-02-08 | Stop reason: HOSPADM

## 2023-02-07 RX ORDER — METOPROLOL TARTRATE 50 MG/1
50 TABLET, FILM COATED ORAL 2 TIMES DAILY
Status: DISCONTINUED | OUTPATIENT
Start: 2023-02-07 | End: 2023-02-08

## 2023-02-07 RX ORDER — METOPROLOL TARTRATE 5 MG/5ML
5 INJECTION INTRAVENOUS EVERY 5 MIN PRN
OUTPATIENT
Start: 2023-02-07 | End: 2023-02-07

## 2023-02-07 RX ORDER — SODIUM CHLORIDE 9 MG/ML
INJECTION, SOLUTION INTRAVENOUS PRN
Status: DISCONTINUED | OUTPATIENT
Start: 2023-02-07 | End: 2023-02-08 | Stop reason: HOSPADM

## 2023-02-07 RX ORDER — ATROPINE SULFATE 0.1 MG/ML
0.5 INJECTION INTRAVENOUS EVERY 5 MIN PRN
OUTPATIENT
Start: 2023-02-07 | End: 2023-02-07

## 2023-02-07 RX ORDER — LISINOPRIL 20 MG/1
40 TABLET ORAL DAILY
Status: DISCONTINUED | OUTPATIENT
Start: 2023-02-07 | End: 2023-02-08 | Stop reason: HOSPADM

## 2023-02-07 RX ORDER — TRAZODONE HYDROCHLORIDE 50 MG/1
100 TABLET ORAL NIGHTLY PRN
Status: DISCONTINUED | OUTPATIENT
Start: 2023-02-07 | End: 2023-02-08 | Stop reason: HOSPADM

## 2023-02-07 RX ORDER — ASPIRIN 81 MG/1
81 TABLET, CHEWABLE ORAL DAILY
Status: DISCONTINUED | OUTPATIENT
Start: 2023-02-08 | End: 2023-02-08 | Stop reason: SDUPTHER

## 2023-02-07 RX ORDER — LABETALOL HYDROCHLORIDE 5 MG/ML
10 INJECTION, SOLUTION INTRAVENOUS EVERY 6 HOURS PRN
Status: DISCONTINUED | OUTPATIENT
Start: 2023-02-07 | End: 2023-02-08 | Stop reason: HOSPADM

## 2023-02-07 RX ORDER — AMLODIPINE BESYLATE 5 MG/1
5 TABLET ORAL DAILY
Qty: 30 TABLET | Refills: 5 | Status: ON HOLD | OUTPATIENT
Start: 2023-02-07 | End: 2023-02-08 | Stop reason: HOSPADM

## 2023-02-07 RX ORDER — LISINOPRIL AND HYDROCHLOROTHIAZIDE 20; 12.5 MG/1; MG/1
2 TABLET ORAL DAILY
Qty: 60 TABLET | Refills: 5 | Status: ON HOLD | OUTPATIENT
Start: 2023-02-07 | End: 2023-02-08 | Stop reason: SDUPTHER

## 2023-02-07 RX ADMIN — FAMOTIDINE 20 MG: 20 TABLET ORAL at 20:26

## 2023-02-07 RX ADMIN — AMLODIPINE BESYLATE 10 MG: 10 TABLET ORAL at 17:24

## 2023-02-07 RX ADMIN — ENOXAPARIN SODIUM 40 MG: 100 INJECTION SUBCUTANEOUS at 17:23

## 2023-02-07 RX ADMIN — AMOXICILLIN 500 MG: 500 CAPSULE ORAL at 20:26

## 2023-02-07 RX ADMIN — METOPROLOL TARTRATE 50 MG: 50 TABLET, FILM COATED ORAL at 20:27

## 2023-02-07 RX ADMIN — FOLIC ACID: 5 INJECTION, SOLUTION INTRAMUSCULAR; INTRAVENOUS; SUBCUTANEOUS at 16:11

## 2023-02-07 RX ADMIN — TRAZODONE HYDROCHLORIDE 100 MG: 50 TABLET ORAL at 21:49

## 2023-02-07 RX ADMIN — CLONIDINE HYDROCHLORIDE 0.2 MG: 0.1 TABLET ORAL at 13:06

## 2023-02-07 RX ADMIN — FUROSEMIDE 20 MG: 20 TABLET ORAL at 17:24

## 2023-02-07 RX ADMIN — ATORVASTATIN CALCIUM 10 MG: 10 TABLET, FILM COATED ORAL at 20:27

## 2023-02-07 RX ADMIN — DOXAZOSIN 2 MG: 2 TABLET ORAL at 17:23

## 2023-02-07 SDOH — ECONOMIC STABILITY: FOOD INSECURITY: WITHIN THE PAST 12 MONTHS, YOU WORRIED THAT YOUR FOOD WOULD RUN OUT BEFORE YOU GOT MONEY TO BUY MORE.: NEVER TRUE

## 2023-02-07 SDOH — ECONOMIC STABILITY: HOUSING INSECURITY
IN THE LAST 12 MONTHS, WAS THERE A TIME WHEN YOU DID NOT HAVE A STEADY PLACE TO SLEEP OR SLEPT IN A SHELTER (INCLUDING NOW)?: NO

## 2023-02-07 SDOH — ECONOMIC STABILITY: INCOME INSECURITY: HOW HARD IS IT FOR YOU TO PAY FOR THE VERY BASICS LIKE FOOD, HOUSING, MEDICAL CARE, AND HEATING?: NOT HARD AT ALL

## 2023-02-07 SDOH — ECONOMIC STABILITY: FOOD INSECURITY: WITHIN THE PAST 12 MONTHS, THE FOOD YOU BOUGHT JUST DIDN'T LAST AND YOU DIDN'T HAVE MONEY TO GET MORE.: NEVER TRUE

## 2023-02-07 ASSESSMENT — PAIN DESCRIPTION - LOCATION
LOCATION: CHEST
LOCATION: CHEST

## 2023-02-07 ASSESSMENT — PAIN DESCRIPTION - FREQUENCY: FREQUENCY: INTERMITTENT

## 2023-02-07 ASSESSMENT — ENCOUNTER SYMPTOMS
DIARRHEA: 0
BACK PAIN: 0
NAUSEA: 0
ABDOMINAL PAIN: 0
COUGH: 0
SINUS PAIN: 0
SORE THROAT: 0
VOMITING: 0
WHEEZING: 0

## 2023-02-07 ASSESSMENT — PAIN DESCRIPTION - ORIENTATION
ORIENTATION: MID
ORIENTATION: MID

## 2023-02-07 ASSESSMENT — PATIENT HEALTH QUESTIONNAIRE - PHQ9
SUM OF ALL RESPONSES TO PHQ QUESTIONS 1-9: 0
SUM OF ALL RESPONSES TO PHQ9 QUESTIONS 1 & 2: 0
10. IF YOU CHECKED OFF ANY PROBLEMS, HOW DIFFICULT HAVE THESE PROBLEMS MADE IT FOR YOU TO DO YOUR WORK, TAKE CARE OF THINGS AT HOME, OR GET ALONG WITH OTHER PEOPLE: 0
SUM OF ALL RESPONSES TO PHQ QUESTIONS 1-9: 0
7. TROUBLE CONCENTRATING ON THINGS, SUCH AS READING THE NEWSPAPER OR WATCHING TELEVISION: 0
SUM OF ALL RESPONSES TO PHQ QUESTIONS 1-9: 0
8. MOVING OR SPEAKING SO SLOWLY THAT OTHER PEOPLE COULD HAVE NOTICED. OR THE OPPOSITE, BEING SO FIGETY OR RESTLESS THAT YOU HAVE BEEN MOVING AROUND A LOT MORE THAN USUAL: 0
SUM OF ALL RESPONSES TO PHQ QUESTIONS 1-9: 0
6. FEELING BAD ABOUT YOURSELF - OR THAT YOU ARE A FAILURE OR HAVE LET YOURSELF OR YOUR FAMILY DOWN: 0
5. POOR APPETITE OR OVEREATING: 0
9. THOUGHTS THAT YOU WOULD BE BETTER OFF DEAD, OR OF HURTING YOURSELF: 0
2. FEELING DOWN, DEPRESSED OR HOPELESS: 0
3. TROUBLE FALLING OR STAYING ASLEEP: 0
4. FEELING TIRED OR HAVING LITTLE ENERGY: 0
1. LITTLE INTEREST OR PLEASURE IN DOING THINGS: 0

## 2023-02-07 ASSESSMENT — PAIN SCALES - GENERAL
PAINLEVEL_OUTOF10: 2
PAINLEVEL_OUTOF10: 4
PAINLEVEL_OUTOF10: 0
PAINLEVEL_OUTOF10: 0

## 2023-02-07 ASSESSMENT — PAIN DESCRIPTION - ONSET
ONSET: ON-GOING
ONSET: OTHER (COMMENT)

## 2023-02-07 ASSESSMENT — HEART SCORE: ECG: 0

## 2023-02-07 ASSESSMENT — PAIN DESCRIPTION - DESCRIPTORS: DESCRIPTORS: DISCOMFORT

## 2023-02-07 ASSESSMENT — PAIN - FUNCTIONAL ASSESSMENT: PAIN_FUNCTIONAL_ASSESSMENT: ACTIVITIES ARE NOT PREVENTED

## 2023-02-07 ASSESSMENT — LIFESTYLE VARIABLES
HOW OFTEN DO YOU HAVE A DRINK CONTAINING ALCOHOL: 4 OR MORE TIMES A WEEK
HOW MANY STANDARD DRINKS CONTAINING ALCOHOL DO YOU HAVE ON A TYPICAL DAY: 1 OR 2

## 2023-02-07 ASSESSMENT — PAIN DESCRIPTION - PAIN TYPE: TYPE: ACUTE PAIN

## 2023-02-07 NOTE — PROGRESS NOTES
Cardiology    Hypertensive urgency  Atypical cp  Detectable trop which I think is demand ischemia  Family hx of CAD  Abnormal EKG  Hyperlipidemia  Insulin resistance     He has had htn for last 4-5 months, which has been markedly elevated. Currently on Lisinopril. BP very elevated in Dr. [employer] office. Trop drawn and was elevated and he was sent to ER. No cp, sob or loss of energy. Does have mild \"pinching\" feeling in chest not related to activity. Will admit for bp control, as well as ischemia workup. Checking labs in AM for secondary reasons for htn. Adding Lopressor and Cardura.     Thanks, Brent Bravo MD

## 2023-02-07 NOTE — PATIENT INSTRUCTIONS
Mr. Yobani Salazar,    Thank you for coming to see me today! I believe that our main problem is your very high blood pressure. Here's what I would recommend we do:    I believe that the blurry vision is from the high blood pressure. I've sent in two new medications. Please fill them both today. Start using amlodipine one time each day, taken around 12:00 noon. If not lowered within 1-2 days, Start using lisinopril-hydrochlorothiazide combination tablet two tablets one time per day. This will be in place of your old doses of lisinopril. We also discussed your ear. I believe that you have a chronic ear effusion, which will improve with a  week of amoxicillin. You may also use a medicated ear drop. Please come back in 2 days    Please review the documents I'm attaching related to what we discussed today. Please give me a call or message me on [facility] if you have any problems or questions, and I will see you at your next visit.     Dr. Romeo Chirinos

## 2023-02-07 NOTE — PROGRESS NOTES
Pt arrives to room 265 via wheelchair, able to ambulate independently into bathroom and back into chair. Rates chest pain 2/10 'pinching' sensation, however relays short time later that he has 0/10 pain, that all chest pain has resolved. VS WDL with exception to 144/101 BP and 99F temp. Pt relays that he currently drinks 2 shots of liquor and smokes 3 cigarettes per day. Cultural request of no beef or pork to be served, kitchen made aware. A&O x4 and able to answer all admission and assessment questions. Wife, children, and mother at bedside. Writer orients pt to room, call light, whiteboard, and staff. Writer reviews upcoming cardiac testing and diet restriction for tomorrow. All questions and concerns answered at this time.

## 2023-02-07 NOTE — PROGRESS NOTES
HPI Notes    Name: Geno Esparza  : 1970         Chief Complaint:     Chief Complaint   Patient presents with    Hypertension     Brought list of BP reading , c/o headaches , blurred vision and high readings at home     Ear Fullness     Onset 5 days Lt ear fullness  has been taking tylenol        History of Present Illness:      HPI    This is a 61-year-old man presenting for evaluation of 5 days of left ear discomfort, fullness. He has been taking Tylenol without appreciable improvement. He has not had any fevers, ear discharge or bleeding. He is also bringing in a list of his home blood pressure readings, as he has been having more frequent headaches, blurry vision corresponding with high blood pressure readings. He is continuing to use lisinopril 40 mg/day, as well as Lasix 20 mg daily. He also has daily chest discomfort of a pinching quality in the left anterior aspect of the chest, nothing seems to improve it, but it does improve on its own. Typically ocurrs 4-5 days/week. He typically sleeps only 4-5 hours a night and does not feel rested in the morning. He is easily able to fall asleep but wakes frequently after 4 hours. Past Medical History:     Past Medical History:   Diagnosis Date    Hyperlipidemia     Hypertension     Insomnia       Reviewed all health maintenance requirements and ordered appropriate tests  Health Maintenance Due   Topic Date Due    HIV screen  Never done    Hepatitis C screen  Never done    Colorectal Cancer Screen  Never done    Shingles vaccine (1 of 2) Never done    COVID-19 Vaccine (4 - Booster for Moderna series) 2022    Flu vaccine (1) 2022       Past Surgical History:     No past surgical history on file. Medications:       Prior to Admission medications    Medication Sig Start Date End Date Taking?  Authorizing Provider   lisinopril-hydroCHLOROthiazide (PRINZIDE;ZESTORETIC) 20-12.5 MG per tablet Take 2 tablets by mouth daily 23  Yes Chyna Parra, DO   amLODIPine (NORVASC) 5 MG tablet Take 1 tablet by mouth daily 2/7/23  Yes Chyna Parra, DO   amoxicillin (AMOXIL) 500 MG capsule Take 1 capsule by mouth 2 times daily for 7 days 2/7/23 2/14/23 Yes Chyna Parra, DO   neomycin-polymyxin-hydrocortisone (CORTISPORIN) 3.5-35616-3 otic solution Place 4 drops into the left ear 3 times daily for 10 days Instill into left Ear 2/7/23 2/17/23 Yes Brain German DO   furosemide (LASIX) 20 MG tablet Take 1 tablet by mouth daily If needed 12/28/22   Brain German DO   atorvastatin (LIPITOR) 10 MG tablet Take 1 tablet by mouth daily 12/28/22   Brain German DO   aspirin 81 MG EC tablet Take 81 mg by mouth daily    Historical Provider, MD   cimetidine (TAGAMET) 300 MG tablet Take 300 mg by mouth 4 times daily    Historical Provider, MD   melatonin 5 MG TABS tablet Take 5 mg by mouth nightly as needed    Historical Provider, MD        Allergies:       Patient has no known allergies. Social History:     Tobacco:    reports that he has been smoking cigarettes. He has a 7.50 pack-year smoking history. He has never used smokeless tobacco.  Alcohol:      reports current alcohol use. Drug Use:  reports no history of drug use. Family History:     Family History   Problem Relation Age of Onset    High Cholesterol Mother     Diabetes Mother     High Blood Pressure Mother     High Cholesterol Father     High Blood Pressure Father     Heart Attack Father        Review of Systems:         Review of Systems   Constitutional:  Negative for fever. HENT:  Positive for ear pain. Negative for ear discharge, sinus pain, sneezing and sore throat. Eyes:  Positive for visual disturbance. Respiratory:  Negative for cough and wheezing. Cardiovascular:  Positive for chest pain. Gastrointestinal:  Negative for abdominal pain, diarrhea, nausea and vomiting. Genitourinary:  Negative for difficulty urinating, dysuria and penile discharge. Musculoskeletal:  Negative for back pain. Skin:  Negative for rash. Neurological:  Positive for headaches. Hematological:  Negative for adenopathy. Psychiatric/Behavioral:  Negative for sleep disturbance. Physical Exam:     Vitals:  BP (!) 164/96 (Site: Right Upper Arm, Position: Sitting, Cuff Size: Medium Adult)   Pulse 62   Resp 20   Ht 5' 5\" (1.651 m)   Wt 170 lb 3.2 oz (77.2 kg)   BMI 28.32 kg/m²       Physical Exam  Vitals and nursing note reviewed. Constitutional:       General: He is not in acute distress. Appearance: Normal appearance. He is normal weight. HENT:      Right Ear: Tympanic membrane, ear canal and external ear normal. There is no impacted cerumen. Left Ear: External ear normal. There is no impacted cerumen. Ears:      Comments: OME of left TM. Canal edematous, mildly erythematous  Cardiovascular:      Rate and Rhythm: Normal rate and regular rhythm. Pulses: Normal pulses. Heart sounds: Normal heart sounds. No murmur heard. Pulmonary:      Effort: Pulmonary effort is normal. No respiratory distress. Breath sounds: Normal breath sounds. No wheezing or rhonchi. Musculoskeletal:         General: No swelling or tenderness. Normal range of motion. Skin:     General: Skin is warm and dry. Capillary Refill: Capillary refill takes less than 2 seconds. Neurological:      General: No focal deficit present. Mental Status: He is alert and oriented to person, place, and time. Mental status is at baseline. Psychiatric:         Mood and Affect: Mood normal.         Behavior: Behavior normal.         Thought Content:  Thought content normal.               Data:     Lab Results   Component Value Date/Time     12/14/2022 09:26 AM    K 4.3 12/14/2022 09:26 AM    CL 96 12/14/2022 09:26 AM    CO2 24 12/14/2022 09:26 AM    BUN 4 12/14/2022 09:26 AM    CREATININE 0.64 12/14/2022 09:26 AM    GLUCOSE 93 12/14/2022 09:26 AM    PROT 7.1 12/14/2022 09:26 AM    LABALBU 4.5 12/14/2022 09:26 AM    BILITOT 0.6 12/14/2022 09:26 AM    ALKPHOS 70 12/14/2022 09:26 AM    AST 51 12/14/2022 09:26 AM    ALT 54 12/14/2022 09:26 AM     Lab Results   Component Value Date/Time    WBC 5.4 12/14/2022 09:26 AM    RBC 5.24 12/14/2022 09:26 AM    HGB 15.8 12/14/2022 09:26 AM    HCT 46.7 12/14/2022 09:26 AM    MCV 89.0 12/14/2022 09:26 AM    MCH 30.2 12/14/2022 09:26 AM    MCHC 34.0 12/14/2022 09:26 AM    RDW 15.9 12/14/2022 09:26 AM     12/14/2022 09:26 AM     No results found for: TSH  Lab Results   Component Value Date/Time    CHOL 145 12/14/2022 09:26 AM    HDL 71 12/14/2022 09:26 AM    LABA1C 5.8 12/14/2022 09:26 AM          Assessment & Plan        Diagnosis Orders   1. Left ear pain        2. Ear fullness, left        3. OME (otitis media with effusion), left  amoxicillin (AMOXIL) 500 MG capsule      4. Chronic eczematous otitis externa of left ear  neomycin-polymyxin-hydrocortisone (CORTISPORIN) 3.5-34792-4 otic solution      5. Essential hypertension  lisinopril-hydroCHLOROthiazide (PRINZIDE;ZESTORETIC) 20-12.5 MG per tablet    amLODIPine (NORVASC) 5 MG tablet    EKG 12 lead    Troponin    Basic Metabolic Panel      6. Blurry vision  lisinopril-hydroCHLOROthiazide (PRINZIDE;ZESTORETIC) 20-12.5 MG per tablet    amLODIPine (NORVASC) 5 MG tablet      7. Hypertensive urgency  lisinopril-hydroCHLOROthiazide (PRINZIDE;ZESTORETIC) 20-12.5 MG per tablet    amLODIPine (NORVASC) 5 MG tablet      8. Chest discomfort  EKG 12 lead    Troponin    Basic Metabolic Panel      9. Primary insomnia            He does have appreciable otitis media with effusion as well as chronic eczematous otitis externa of the left ear. I would recommend amoxicillin to aid with resolution of the OME, as well as Cortisporin drops and he may follow-up as needed for this problem    He does seem to be having hypertensive urgency.   I would recommend initiation of a long-acting calcium channel blocker such as amlodipine 5 mg/day, if not improved within 24 hours, will exchange his lisinopril for lisinopril hydrochlorothiazide combination therapy. I will check BMP, troponin, EKG to exclude acute coronary syndrome today. He has a reassuring cardiopulmonary examination in the office as well as reassuring vital signs with the exception of his blood pressure. I would like him to return to the office in 2 days to reassess treatment          Completed Refills   Requested Prescriptions     Signed Prescriptions Disp Refills    lisinopril-hydroCHLOROthiazide (PRINZIDE;ZESTORETIC) 20-12.5 MG per tablet 60 tablet 5     Sig: Take 2 tablets by mouth daily    amLODIPine (NORVASC) 5 MG tablet 30 tablet 5     Sig: Take 1 tablet by mouth daily    amoxicillin (AMOXIL) 500 MG capsule 14 capsule 0     Sig: Take 1 capsule by mouth 2 times daily for 7 days    neomycin-polymyxin-hydrocortisone (CORTISPORIN) 3.5-95965-1 otic solution 1 each 0     Sig: Place 4 drops into the left ear 3 times daily for 10 days Instill into left Ear     Return in about 2 days (around 2/9/2023) for hypertensive urgency.   Orders Placed This Encounter   Medications    lisinopril-hydroCHLOROthiazide (PRINZIDE;ZESTORETIC) 20-12.5 MG per tablet     Sig: Take 2 tablets by mouth daily     Dispense:  60 tablet     Refill:  5    amLODIPine (NORVASC) 5 MG tablet     Sig: Take 1 tablet by mouth daily     Dispense:  30 tablet     Refill:  5    amoxicillin (AMOXIL) 500 MG capsule     Sig: Take 1 capsule by mouth 2 times daily for 7 days     Dispense:  14 capsule     Refill:  0    neomycin-polymyxin-hydrocortisone (CORTISPORIN) 3.5-70180-6 otic solution     Sig: Place 4 drops into the left ear 3 times daily for 10 days Instill into left Ear     Dispense:  1 each     Refill:  0     Orders Placed This Encounter   Procedures    Troponin     Standing Status:   Future     Standing Expiration Date:   6/7/9633    Basic Metabolic Panel     Standing Status:   Future Standing Expiration Date:   2/7/2024    EKG 12 lead     Standing Status:   Future     Standing Expiration Date:   2/7/2024     Order Specific Question:   Reason for Exam?     Answer:   Chest pain         Patient Instructions   Mr. Johnny Ayoub,    Thank you for coming to see me today! I believe that our main problem is your very high blood pressure. Here's what I would recommend we do:    I believe that the blurry vision is from the high blood pressure. I've sent in two new medications. Please fill them both today. Start using amlodipine one time each day, taken around 12:00 noon. If not lowered within 1-2 days, Start using lisinopril-hydrochlorothiazide combination tablet two tablets one time per day. This will be in place of your old doses of lisinopril. We also discussed your ear. I believe that you have a chronic ear effusion, which will improve with a  week of amoxicillin. You may also use a medicated ear drop. Please come back in 2 days    Please review the documents I'm attaching related to what we discussed today. Please give me a call or message me on [facility] if you have any problems or questions, and I will see you at your next visit.     Dr. Talita Buenrostro      Electronically signed by Eve Ambriz DO on 2/7/2023 at 11:07 AM           Completed Refills   Requested Prescriptions     Signed Prescriptions Disp Refills    lisinopril-hydroCHLOROthiazide (PRINZIDE;ZESTORETIC) 20-12.5 MG per tablet 60 tablet 5     Sig: Take 2 tablets by mouth daily    amLODIPine (NORVASC) 5 MG tablet 30 tablet 5     Sig: Take 1 tablet by mouth daily    amoxicillin (AMOXIL) 500 MG capsule 14 capsule 0     Sig: Take 1 capsule by mouth 2 times daily for 7 days    neomycin-polymyxin-hydrocortisone (CORTISPORIN) 3.5-10708-6 otic solution 1 each 0     Sig: Place 4 drops into the left ear 3 times daily for 10 days Instill into left Ear

## 2023-02-07 NOTE — ED PROVIDER NOTES
eMERGENCY dEPARTMENT eNCOUnter        [address] [facility]    Chief Complaint   Patient presents with    Chest Pain     C/O chest pain for past couple of months, was seen by Dr. Anni Lara today who sent him over for elevated Troponin and abnormal EKG. Also complains of worsening blurry vision for past couple of months, he thinks from high blood pressure       HPI    Felisha Garcia is a 47 y.o. male who was sent to ED for above complaint. Pt states for past two months, he has had blurred vision, headaches, and chest discomfort. Describes the chest discomfort as a pinching sensation in her left anterior chest field. Mild SOB associated. Pain occurs 4-5 days a week, typically in the afternoon. He states it occurs at rest and when he is active. He denies ever taking nitroglycerin for these symptoms. He maintains a BP log and takes his BP 3 times daily. States in the afternoon, he tends to run in the 799-599R systolic and 632B-035Q diastolic. It typically normalizes in evening with average readings in 120s/80s. He states he smokes 3 cigarettes daily for past 30 years. He has 2 shots of liquor nightly. He has a positive family hx of MI in both mother and father, which his father's MI occurring at age 52. He used to see Dr. Kenyatta Fraga and had an echo done in 2018. Denies any stent placement or heart surgeries. He takes a baby aspirin nightly. REVIEW OF SYSTEMS    All systems reviewed and positives are in the HPI. PAST MEDICAL HISTORY    Past Medical History:   Diagnosis Date    Hyperlipidemia     Hypertension     Insomnia        SURGICAL HISTORY    History reviewed. No pertinent surgical history.     CURRENT MEDICATIONS    Current Outpatient Rx   Medication Sig Dispense Refill    lisinopril-hydroCHLOROthiazide (PRINZIDE;ZESTORETIC) 20-12.5 MG per tablet Take 2 tablets by mouth daily 60 tablet 5    amLODIPine (NORVASC) 5 MG tablet Take 1 tablet by mouth daily 30 tablet 5    amoxicillin (AMOXIL) 500 MG capsule Take 1 capsule by mouth 2 times daily for 7 days (Patient not taking: Reported on 2/7/2023) 14 capsule 0    neomycin-polymyxin-hydrocortisone (CORTISPORIN) 3.5-03436-5 otic solution Place 4 drops into the left ear 3 times daily for 10 days Instill into left Ear (Patient not taking: Reported on 2/7/2023) 1 each 0    furosemide (LASIX) 20 MG tablet Take 1 tablet by mouth daily If needed 60 tablet 5    atorvastatin (LIPITOR) 10 MG tablet Take 1 tablet by mouth daily 30 tablet 5    aspirin 81 MG EC tablet Take 81 mg by mouth daily      cimetidine (TAGAMET) 300 MG tablet Take 300 mg by mouth 4 times daily      melatonin 5 MG TABS tablet Take 5 mg by mouth nightly as needed         ALLERGIES    No Known Allergies    FAMILY HISTORY    Family History   Problem Relation Age of Onset    High Cholesterol Mother     Diabetes Mother     High Blood Pressure Mother     High Cholesterol Father     High Blood Pressure Father     Heart Attack Father        SOCIAL HISTORY    Social History     Socioeconomic History    Marital status:      Spouse name: None    Number of children: None    Years of education: None    Highest education level: None   Tobacco Use    Smoking status: Every Day     Packs/day: 0.25     Years: 30.00     Pack years: 7.50     Types: Cigarettes    Smokeless tobacco: Never   Vaping Use    Vaping Use: Never used   Substance and Sexual Activity    Alcohol use: Yes     Comment: 2 shots a days    Drug use: Never    Sexual activity: Yes     Partners: Female     Social Determinants of Health     Financial Resource Strain: Low Risk     Difficulty of Paying Living Expenses: Not hard at all   Food Insecurity: No Food Insecurity    Worried About Running Out of Food in the Last Year: Never true    Ran Out of Food in the Last Year: Never true   Transportation Needs: Unknown    Lack of Transportation (Non-Medical): No   Housing Stability: Unknown    Unstable Housing in the Last Year: No       PHYSICAL EXAM    VITAL SIGNS: BP (!) 147/102   Pulse 84   Temp 98.3 °F (36.8 °C) (Oral)   Resp 26   Ht 5' 6\" (1.676 m)   Wt 171 lb 3.2 oz (77.7 kg)   SpO2 98%   BMI 27.63 kg/m²    Constitutional:  Well developed, well nourished, no acute distress   HENT:  Atraumatic, scleral icterus, external ears normal, nose normal, oropharynx moist. Neck- supple   Respiratory:   Lungs CTA. No accessory muscle use. Cardiovascular:  Heart sounds normal. No murmurs, rubs. GI:  Soft, nondistended, normal bowel sounds, nontender, no organomegaly, no mass, no rebound, no guarding   Musculoskeletal:  No edema, no tenderness, no deformities. Back- no tenderness   Integument:  Well hydrated, no rash   Neurologic:  Alert & oriented x 3, no focal deficits noted     EKG    Normal sinus rhythm. Rate 73 bpm. Qtc 414 ms. No ST segment changes. RADIOLOGY/PROCEDURES    . wet    Labs  Labs Reviewed   CBC WITH AUTO DIFFERENTIAL - Abnormal; Notable for the following components:       Result Value    Seg Neutrophils 77 (*)     Lymphocytes 10 (*)     Absolute Lymph # 0.80 (*)     All other components within normal limits   BASIC METABOLIC PANEL - Abnormal; Notable for the following components:    BUN 3 (*)     Creatinine 0.64 (*)     Bun/Cre Ratio 5 (*)     Sodium 126 (*)     Chloride 89 (*)     All other components within normal limits   TROPONIN - Abnormal; Notable for the following components:    Troponin, High Sensitivity 55 (*)     All other components within normal limits   HEPATIC FUNCTION PANEL - Abnormal; Notable for the following components:    ALT 44 (*)     AST 51 (*)     All other components within normal limits   TSH WITH REFLEX   MAGNESIUM           EMERGENCY DEPARTMENT COURSE and DIFFERENTIAL DIAGNOSIS/MDM:    Patient Course:     48 y/o M sent to ED by PCP for elevated trop. Pt has been having a pinching chest discomfort 4-5 days a week for the past 2 months with associated headaches, blurred vision, and mild SOB.  States it typically occurs in afternoon, but will occur at rest and exertional. He has never been prescribed nitroglycerin. Pt maintains a BP log 3 times daily. States in the afternoon, it's typically 747-793H/887-221N systolic and 053D-148W diastolic and then normalizes in evening to 120s/80s. He has a positive cardiac fhx. Smokes 3 cigarettes daily. 2 shots of liquor daily. Denies any stent placements or prior cardiac procedures. Initial trop ordered by PCP was 58. Repeat trop, CBC, CMP, LFTs, and EKG ordered. On PE, his BP is 198/107. Scleral icterus noted. Remaining PE unremarkable. 0.2 clonidine tablet PO given for hypertension in ED. Repeat trop is 55. Remaining labs unremarkable. Dr. Clare Posadas consulted and recommended him for admission for stress test.     Number and complexity of problems:    Differential Diagnosis: Unstable angina vs hypertensive emergency vs NSTEMI    Pertinent Comorbid Conditions:  HTN, everyday smoker, positive cardiac family hx     2)  Data Reviewed    Decision Rules/Scores utilized:  Heart score: 6. Labs/tests: CBC, CMP, Trop, LFTs, EKG    EKG/rhythm strips: Normal sinus rhythm. Rate 73 bpm. Qtc 414 ms. No ST segment changes. Radiology interpretation/review: n/a      3)  Treatment and Disposition    Disposition discussion with patient/family:  Discussed with patient/family being admitted for stress test due to concerning history of current problem. Shared Decision Making:  Patient understands and agrees with dispo planning. Case discussed with consulting clinician:  Dr. Clare Posadas. Patient is discussed with João Dejesus. Patient will be admitted for further evaluation and treatment. Patient is chest pain-free prior to admission.     ED Medications administered this visit:    Medications   cloNIDine (CATAPRES) tablet 0.2 mg (0.2 mg Oral Given 2/7/23 1306)       New Prescriptions from this visit:    New Prescriptions    No medications on file       Follow-up:  No follow-up provider specified. Final Impression:   1. Chest pain, unspecified type    2.  Primary hypertension               (Please note that portions of this note were completed with a voice recognition program.  Efforts were made to edit the dictations but occasionally words are mis-transcribed.)       [place]  02/07/23 1434       Janay Tierney MD  02/07/23 [id number]

## 2023-02-08 ENCOUNTER — HOSPITAL ENCOUNTER (OUTPATIENT)
Dept: NUCLEAR MEDICINE | Age: 53
Discharge: HOME | DRG: 199 | End: 2023-02-10
Payer: MEDICAID

## 2023-02-08 ENCOUNTER — TELEPHONE (OUTPATIENT)
Dept: FAMILY MEDICINE CLINIC | Age: 53
End: 2023-02-08

## 2023-02-08 VITALS
RESPIRATION RATE: 16 BRPM | HEART RATE: 74 BPM | SYSTOLIC BLOOD PRESSURE: 140 MMHG | HEIGHT: 66 IN | BODY MASS INDEX: 27.51 KG/M2 | WEIGHT: 171.2 LBS | TEMPERATURE: 98.6 F | DIASTOLIC BLOOD PRESSURE: 86 MMHG | OXYGEN SATURATION: 100 %

## 2023-02-08 LAB
ANION GAP SERPL CALCULATED.3IONS-SCNC: 10 MMOL/L (ref 9–17)
BUN SERPL-MCNC: 5 MG/DL (ref 6–20)
BUN/CREAT BLD: 7 (ref 9–20)
CALCIUM SERPL-MCNC: 8.9 MG/DL (ref 8.6–10.4)
CHLORIDE SERPL-SCNC: 95 MMOL/L (ref 98–107)
CO2 SERPL-SCNC: 26 MMOL/L (ref 20–31)
CORTISOL COLLECTION INFO: 1635
CORTISOL: 10.5 UG/DL (ref 2.7–18.4)
CREAT SERPL-MCNC: 0.68 MG/DL (ref 0.7–1.2)
EKG ATRIAL RATE: 64 BPM
EKG ATRIAL RATE: 73 BPM
EKG ATRIAL RATE: 92 BPM
EKG P AXIS: 35 DEGREES
EKG P AXIS: 64 DEGREES
EKG P AXIS: 68 DEGREES
EKG P-R INTERVAL: 144 MS
EKG P-R INTERVAL: 144 MS
EKG P-R INTERVAL: 150 MS
EKG Q-T INTERVAL: 354 MS
EKG Q-T INTERVAL: 376 MS
EKG Q-T INTERVAL: 438 MS
EKG QRS DURATION: 76 MS
EKG QRS DURATION: 86 MS
EKG QRS DURATION: 88 MS
EKG QTC CALCULATION (BAZETT): 414 MS
EKG QTC CALCULATION (BAZETT): 437 MS
EKG QTC CALCULATION (BAZETT): 451 MS
EKG R AXIS: 17 DEGREES
EKG R AXIS: 51 DEGREES
EKG R AXIS: 57 DEGREES
EKG T AXIS: 48 DEGREES
EKG T AXIS: 56 DEGREES
EKG T AXIS: 57 DEGREES
EKG VENTRICULAR RATE: 64 BPM
EKG VENTRICULAR RATE: 73 BPM
EKG VENTRICULAR RATE: 92 BPM
GFR SERPL CREATININE-BSD FRML MDRD: >60 ML/MIN/1.73M2
GLUCOSE SERPL-MCNC: 101 MG/DL (ref 70–99)
HCT VFR BLD AUTO: 43.5 % (ref 41–53)
HGB BLD-MCNC: 14.6 G/DL (ref 13.5–17.5)
LV EF: 60 %
LVEF MODALITY: NORMAL
MAGNESIUM SERPL-MCNC: 2.5 MG/DL (ref 1.6–2.6)
MCH RBC QN AUTO: 30.4 PG (ref 26–34)
MCHC RBC AUTO-ENTMCNC: 33.5 G/DL (ref 31–37)
MCV RBC AUTO: 90.9 FL (ref 80–100)
PDW BLD-RTO: 14.2 % (ref 12.1–15.2)
PLATELET # BLD AUTO: 168 K/UL (ref 140–450)
POTASSIUM SERPL-SCNC: 4.2 MMOL/L (ref 3.7–5.3)
RBC # BLD: 4.79 M/UL (ref 4.5–5.9)
SODIUM SERPL-SCNC: 131 MMOL/L (ref 135–144)
WBC # BLD AUTO: 5.1 K/UL (ref 3.5–11)

## 2023-02-08 PROCEDURE — 94761 N-INVAS EAR/PLS OXIMETRY MLT: CPT

## 2023-02-08 PROCEDURE — 6360000002 HC RX W HCPCS: Performed by: INTERNAL MEDICINE

## 2023-02-08 PROCEDURE — 36415 COLL VENOUS BLD VENIPUNCTURE: CPT

## 2023-02-08 PROCEDURE — 2580000003 HC RX 258: Performed by: INTERNAL MEDICINE

## 2023-02-08 PROCEDURE — 78452 HT MUSCLE IMAGE SPECT MULT: CPT

## 2023-02-08 PROCEDURE — 93017 CV STRESS TEST TRACING ONLY: CPT

## 2023-02-08 PROCEDURE — 93306 TTE W/DOPPLER COMPLETE: CPT

## 2023-02-08 PROCEDURE — 3430000000 HC RX DIAGNOSTIC RADIOPHARMACEUTICAL: Performed by: INTERNAL MEDICINE

## 2023-02-08 PROCEDURE — A9500 TC99M SESTAMIBI: HCPCS | Performed by: INTERNAL MEDICINE

## 2023-02-08 PROCEDURE — 6370000000 HC RX 637 (ALT 250 FOR IP): Performed by: INTERNAL MEDICINE

## 2023-02-08 PROCEDURE — 83735 ASSAY OF MAGNESIUM: CPT

## 2023-02-08 PROCEDURE — 93005 ELECTROCARDIOGRAM TRACING: CPT | Performed by: INTERNAL MEDICINE

## 2023-02-08 PROCEDURE — 80048 BASIC METABOLIC PNL TOTAL CA: CPT

## 2023-02-08 PROCEDURE — 93010 ELECTROCARDIOGRAM REPORT: CPT | Performed by: INTERNAL MEDICINE

## 2023-02-08 PROCEDURE — 85027 COMPLETE CBC AUTOMATED: CPT

## 2023-02-08 RX ORDER — TECHNETIUM TC-99M SESTAMIBI 1 MG/10ML
30 INJECTION INTRAVENOUS
Status: COMPLETED | OUTPATIENT
Start: 2023-02-08 | End: 2023-02-08

## 2023-02-08 RX ORDER — SODIUM CHLORIDE 0.9 % (FLUSH) 0.9 %
5-40 SYRINGE (ML) INJECTION PRN
Status: DISCONTINUED | OUTPATIENT
Start: 2023-02-08 | End: 2023-02-08 | Stop reason: HOSPADM

## 2023-02-08 RX ORDER — LISINOPRIL AND HYDROCHLOROTHIAZIDE 20; 12.5 MG/1; MG/1
2 TABLET ORAL DAILY
Qty: 60 TABLET | Refills: 5 | Status: SHIPPED | OUTPATIENT
Start: 2023-02-08

## 2023-02-08 RX ORDER — TECHNETIUM TC-99M SESTAMIBI 1 MG/10ML
10 INJECTION INTRAVENOUS
Status: COMPLETED | OUTPATIENT
Start: 2023-02-08 | End: 2023-02-08

## 2023-02-08 RX ORDER — DOXAZOSIN 2 MG/1
2 TABLET ORAL DAILY
Qty: 30 TABLET | Refills: 3 | Status: SHIPPED | OUTPATIENT
Start: 2023-02-09

## 2023-02-08 RX ORDER — AMINOPHYLLINE DIHYDRATE 25 MG/ML
50 INJECTION, SOLUTION INTRAVENOUS PRN
Status: DISCONTINUED | OUTPATIENT
Start: 2023-02-08 | End: 2023-02-08 | Stop reason: HOSPADM

## 2023-02-08 RX ADMIN — METOPROLOL TARTRATE 25 MG: 25 TABLET, FILM COATED ORAL at 15:27

## 2023-02-08 RX ADMIN — SODIUM CHLORIDE, PRESERVATIVE FREE 10 ML: 5 INJECTION INTRAVENOUS at 14:00

## 2023-02-08 RX ADMIN — TETRAKIS(2-METHOXYISOBUTYLISOCYANIDE)COPPER(I) TETRAFLUOROBORATE 30 MILLICURIE: 1 INJECTION, POWDER, LYOPHILIZED, FOR SOLUTION INTRAVENOUS at 13:00

## 2023-02-08 RX ADMIN — SODIUM CHLORIDE, PRESERVATIVE FREE 10 ML: 5 INJECTION INTRAVENOUS at 08:36

## 2023-02-08 RX ADMIN — REGADENOSON 0.4 MG: 0.08 INJECTION, SOLUTION INTRAVENOUS at 14:00

## 2023-02-08 RX ADMIN — FAMOTIDINE 20 MG: 20 TABLET ORAL at 08:35

## 2023-02-08 RX ADMIN — TETRAKIS(2-METHOXYISOBUTYLISOCYANIDE)COPPER(I) TETRAFLUOROBORATE 10 MILLICURIE: 1 INJECTION, POWDER, LYOPHILIZED, FOR SOLUTION INTRAVENOUS at 13:00

## 2023-02-08 RX ADMIN — ENOXAPARIN SODIUM 40 MG: 100 INJECTION SUBCUTANEOUS at 08:35

## 2023-02-08 RX ADMIN — HYDROCHLOROTHIAZIDE 25 MG: 25 TABLET ORAL at 15:27

## 2023-02-08 RX ADMIN — LISINOPRIL 40 MG: 20 TABLET ORAL at 15:27

## 2023-02-08 RX ADMIN — ATORVASTATIN CALCIUM 10 MG: 10 TABLET, FILM COATED ORAL at 08:35

## 2023-02-08 RX ADMIN — AMOXICILLIN 500 MG: 500 CAPSULE ORAL at 15:26

## 2023-02-08 RX ADMIN — AMOXICILLIN 500 MG: 500 CAPSULE ORAL at 08:35

## 2023-02-08 RX ADMIN — ASPIRIN 81 MG: 81 TABLET, COATED ORAL at 08:35

## 2023-02-08 RX ADMIN — DOXAZOSIN 2 MG: 2 TABLET ORAL at 15:27

## 2023-02-08 ASSESSMENT — PAIN SCALES - GENERAL
PAINLEVEL_OUTOF10: 0

## 2023-02-08 NOTE — PROGRESS NOTES
Patient refused the Melatonin offered to him during med pass, stating \"I would like something stronger because that isn't working for me. I just would like a good nights rest please. \" Informed patient that a call would be placed soon to Dr. Anson Noe for his request. Patient satisfied and has no further needs at this time.

## 2023-02-08 NOTE — PROGRESS NOTES
Testing complete - patient tolerated well. Patient denies any pain/discomfort or shortness of breath. Drink provided - patient refused snack.

## 2023-02-08 NOTE — PROGRESS NOTES
At chairside with patient, vitals obtained, assessment completed, white board updated. Patient denies having any pain. Patient inquired about his troponin levels, information given to patient. Patient satisfied and has no further needs at this time. Will continue to monitor.

## 2023-02-08 NOTE — CONSULTS
[place] [address]                                  CONSULTATION    PATIENT NAME: Melissa Posadas                 :        1970  MED REC NO:   [id number]                              ROOM:       4940  ACCOUNT NO:   [de-identified]                           ADMIT DATE: 2023  PROVIDER:     Vanna Dobbs MD    CONSULT DATE:  2023    REASON FOR CONSULTATION:  Hypertensive urgency. HISTORY OF PRESENT ILLNESS:  The patient is a very pleasant 71-year-old  gentleman whose father was a patient of mine and his mother is a current  patient of mine. I have not seen the patient previously. He has never had a myocardial infarction or cardiac catheterization. He  has never had a stress test.    He established with Dr. Elina Partida on 2022. He had noticed that his  blood pressure was elevated when he checked his blood pressure in his  mother's device, approximately three months ago. His blood pressure was  in the 180 to 200 over 100 to 120 range. He saw Dr. Elina Partida on 2022. At that time, Dr. Elina Partida started  lisinopril 20 mg daily and continued his Lasix. He saw him again on , and his blood pressure was still elevated. He increased lisinopril to 20 mg b.i.d. and continued Lasix. Dr. Elina Partida saw him in his office today. He had a blood pressure of  200/100 at home. He was complaining of mild blurry vision with more  frequent headaches. He also complained of a \"pinch\" in his chest that  could last for hours, was not related to activity. He therefore sent  him to the emergency room. His troponin was elevated at 58, with a  repeat troponin of 55. His blood pressure when he arrived was 197/116. I came to see him in the emergency room. He was resting quietly. He  denied any chest pain.   His energy level has been good and he has had no  unusual shortness of breath. He has had no exertional chest pain. He  is frustrated with his hypertension, which he states has \"continued to  be very elevated. \"    He denies any syncope or near syncope. Denies any palpitations. He had some pedal edema but this has resolved with Lasix. 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 His mother also lives with them, which is somewhat of a stressful  situation. CARDIAC RISK FACTORS:  Hyperlipidemia:  Positive. Smoking:  Positive. Hypertension:  Positive. Other Family Members:  Positive. Peripheral Vascular Disease:  Negative. Diabetes:  Positive (hemoglobin A1c was 6.3.)    MEDICATIONS AT HOME:  He was on Prinzide 20/12.5 b.i.d., Norvasc 5 mg  daily, Lasix 20 mg daily, Lipitor 10 mg daily, aspirin 81 mg daily,  Tagamet 300 mg q.i.d., melatonin 5 mg p.r.n. PAST MEDICAL AND SURGICAL HISTORY:  1. He has a history of hyperlipidemia. 2.  Hypertension. 3.  I do not believe he has had any surgeries. FAMILY HISTORY:  Both his mother and father had coronary artery disease,  with his father having severe cardiomyopathy and his mother had  angioplasty. SOCIAL HISTORY:  He is 47years old, . He has two children, both  boys. He smokes one pack of cigarettes a day. Drinks alcohol. Several  beers nightly. Runs a hotel. REVIEW OF SYSTEMS:  Cardiac as above. Other systems reviewed including  constitutional, eyes, ears, nose and throat, cardiovascular,  respiratory, GI, , musculoskeletal, integumentary, neurologic,  endocrine, hematologic and allergic/immunologic are negative except for  described above. No weight loss or weight gain. No change in bowel  habits. No blood in stools. No fever, sweats or chills. PHYSICAL EXAMINATION:  VITAL SIGNS:  His blood pressure was elevated at 197/116 with a heart  rate of 96 and regular. Respirations 18. O2 sat 99%. Weight 171  pounds.   GENERAL:  He is a very pleasant 61-year-old gentleman. Denied pain. He  was oriented to person, place and time. Answered questions  appropriately. He was somewhat nervous. SKIN:  No unusual skin changes. HEENT:  The pupils are equally round and intact. Mucous membranes were  dry. NECK:  No JVD. Good carotid pulses. No carotid bruits. No  lymphadenopathy or thyromegaly. CARDIOVASCULAR EXAM:  S1 and S2 were normal.  No S3 or S4. Soft  systolic blowing type murmur. No diastolic murmur. PMI was normal.  No  lift, thrust, or pericardial friction rub. LUNGS:  Clear to auscultation and percussion. ABDOMEN:  Soft and nontender. Good bowel sounds. EXTREMITIES:  Good femoral pulses. Good pedal pulses. Mild pedal  edema. Skin was warm and dry. No calf tenderness. Nail beds pink. Good cap refill. PULSES:  Bilateral symmetrical radial, brachial and carotid pulses. No  carotid bruits. Good femoral and pedal pulses. NEUROLOGIC EXAM:  Within normal limits. PSYCHIATRIC EXAM:  Within normal limits. LABORATORY DATA:  Sodium 128, potassium 4.1, BUN 3, creatinine 0.58, GFR  greater than 60. Glucose was 101. Troponin was 58, with a repeat  troponin of 55. ALT was 44, AST was 51. TSH was 0.92. White count  7.4, hemoglobin 15.4 with a platelet count of 352,650. EKG showed sinus rhythm with nonspecific ST changes. I do not have a  previous EKG to compare. Chest x-ray showed cardiomegaly. Lung field was unremarkable except for  \"probable artifact in the right apex, mimicking small pneumothorax. \"    IMPRESSION:  1. Hypertensive urgency. 2.  Detectable troponins at 58, with a repeat troponin of 55.  3.  Occasional \"pinching\" in his chest, which can last for hours or  seconds, and is not related to any activity. 4.  Mildly elevated liver function tests, which may be related to his  daily beer intake. 5.  Hyperlipidemia. 6.  Smoking history. 7.  Family history of coronary artery disease.   8.  Glucose intolerance with a hemoglobin A1c of 6.3.  9. Hyponatremia, with sodium of 128. PLAN:  1. Place him on Cardura 2 mg daily. 2.  Add beta-blocker with Lopressor 50 mg b.i.d.  3.  Would stop Norvasc and continue lisinopril/hydrochlorothiazide. 4.  Would stop Lasix. 5.  Echocardiogram.  6.  Lexiscan Cardiolite stress test because of his abnormal EKG and  detectable troponins. DISCUSSION:  The patient noticed his blood pressure markedly elevated in  approximately October. He had evidently had some hypertension  previously, treated by [facility]. He was taking Lasix on an as needed  basis for edema. His blood pressure at home was noted to be in the 200/120 range. He saw Dr. Tisha Gonzalez, who placed him on lisinopril and increased this to  20 mg b.i.d., however, placed him on ultimately  lisinopril/hydrochlorothiazide 20/12.5 b.i.d. He is also on Norvasc 5  mg daily and Lasix 20 mg daily. When I see the patient, I note that he is somewhat of a nervous, anxious  person. He also states his blood pressure is \"all over the place,\"  however, mainly quite elevated. He has been having blurred vision and headaches for the last five days. In addition, he had a \"pinching\" in his chest.  When I see him in the  emergency room, he denies any chest pain or chest discomfort. He also  gives no symptoms of exertional chest pain or unusual shortness of  breath or loss of energy. I will rule out secondary reasons for hypertension with metanephrines,  catecholamines and renin and aldosterone levels. He has had no change  in bowel habits to indicate that he could have a carcinoid. I rather  doubt, however, that this is secondary hypertension. Since he is somewhat anxious, I will place him on alpha and  beta-blocker. I would stop Norvasc and Lasix, and continue  lisinopril/hydrochlorothiazide.     We will do an echocardiogram in view of his hypertensive urgency and we  will also do a Lexiscan Cardiolite stress test because of his abnormal  EKG and detectable troponins. He does have multiple risk factors for  coronary artery disease with smoking history, hypertension,  hyperlipidemia and family history of CAD. His liver enzymes are mildly elevated and this might be secondary to his  beer intake. Thank you very much for allowing me the privilege of seeing the patient. If you have any questions on my thoughts, please do not hesitate to  contact me. Devin Fox MD    D: 02/08/2023 4:45:32       T: 02/08/2023 4:50:32     [email or web address]  Job#: [id number]     Doc#: [id number]    CC:  Chepe Ibrahim.  Liya Zuniga DO Prior hx of cocaine use

## 2023-02-08 NOTE — H&P
History & Physical    Patient:  Melissa Dobbs  YOB: 1970  Date of Service: 2/8/2023  MRN: [id number]   Acct:   [de-identified]   Primary Care Physician: Juan Antonio Roman DO    Chief Complaint:   Chief Complaint   Patient presents with    Chest Pain     C/O chest pain for past couple of months, was seen by Dr. Janice Tucker today who sent him over for elevated Troponin and abnormal EKG. Also complains of worsening blurry vision for past couple of months, he thinks from high blood pressure       History of Present Illness:     History obtained from the patient. The patient is a 47 y.o. male presented to the emergency room for evaluation of elevated blood pressure, chest discomfort during his routine office visit. The patient states that about 3 months ago he used his mother's blood pressure machine to check his blood pressure and it was elevated around 180-200/. He had no symptoms at that time. He went to see Raghav Mcintyre CNP and apparently was started on Lasix which did not help to lower his blood pressure. He switched his care to Dr. Timothy Greenberg and was prescribed lisinopril and HCTZ. Subsequently his lisinopril dose was increased due to uncontrolled hypertension. Yesterday the patient went to see him again for routine follow-up and also for earache. Patient states that his blood pressure was very high and he was also having some \"pinching\" sensation in the left upper chest.  The patient was sent to the emergency room for evaluation. Patient states that chest discomfort has been present for more than a week almost on a daily basis, lasting couple of hours and resolving spontaneously. He states its mild. Pain is nonradiating. He has no associated shortness of breath, dizziness, diaphoresis, nausea/vomiting. Patient has a strong family history of CAD. He smokes couple of cigarettes a day and drinks 2 shots of whiskey every evening.   Patient's work-up in the emergency room revealed elevated BP 197/116 with heart rate 96, RR 18. Oxygen saturation was 99% on room air. BMP revealed sodium 128, potassium 4.1, chloride 93, BUN 3, creatinine 1.58. Initial troponin was 58 with repeat level 55 and then 37. LFTs showed mildly elevated ALT 44 and AST 51. Random glucose was 99. CBC essentially normal.  COVID-19 was negative. Chest x-ray revealed no acute cardiopulmonary process. EKG showed normal sinus rhythm with rate of 92, nonspecific ST and T wave abnormalities. Patient was evaluated by Dr. Alonso Vergara in the emergency room and recommended admission for further work-up with echo, stress test and adjusting medications for uncontrolled hypertension. This morning the patient has no complaints. Past Medical History:        Diagnosis Date    Hyperlipidemia     Hypertension     Insomnia        Past Surgical History:  History reviewed. No pertinent surgical history. Home Medications:   No current facility-administered medications on file prior to encounter.      Current Outpatient Medications on File Prior to Encounter   Medication Sig Dispense Refill    lisinopril-hydroCHLOROthiazide (PRINZIDE;ZESTORETIC) 20-12.5 MG per tablet Take 2 tablets by mouth daily 60 tablet 5    amLODIPine (NORVASC) 5 MG tablet Take 1 tablet by mouth daily 30 tablet 5    amoxicillin (AMOXIL) 500 MG capsule Take 1 capsule by mouth 2 times daily for 7 days (Patient not taking: Reported on 2/7/2023) 14 capsule 0    neomycin-polymyxin-hydrocortisone (CORTISPORIN) 3.5-39198-8 otic solution Place 4 drops into the left ear 3 times daily for 10 days Instill into left Ear (Patient not taking: Reported on 2/7/2023) 1 each 0    furosemide (LASIX) 20 MG tablet Take 1 tablet by mouth daily If needed 60 tablet 5    atorvastatin (LIPITOR) 10 MG tablet Take 1 tablet by mouth daily 30 tablet 5    aspirin 81 MG EC tablet Take 81 mg by mouth daily      cimetidine (TAGAMET) 300 MG tablet Take 300 mg by mouth 4 times daily      melatonin 5 MG TABS tablet Take 5 mg by mouth nightly as needed         Allergies:  Patient has no known allergies. Social History:    reports that he has been smoking cigarettes. He has a 7.50 pack-year smoking history. He has never used smokeless tobacco. He reports current alcohol use. He reports that he does not use drugs. Family History:       Problem Relation Age of Onset    High Cholesterol Mother     Diabetes Mother     High Blood Pressure Mother     High Cholesterol Father     High Blood Pressure Father     Heart Attack Father        Review of systems:  Constitutional: no fever, no night sweats, no fatigue  Head: no headache, no head injury, no migranes. Eye: no blurring of vision, no double vision. Ears: no hearing difficulty, no tinnitus  Mouth/throat: no ulceration, dental caries, dysphagia  Lungs: no cough, no shortness of breath, no wheeze  CVS: no palpitation, positive for chest pain, no shortness of breath  GI: no abdominal pain, no nausea , no vomiting, no constipation  TEENA: no dysuria, frequency and urgency, no hematuria, no kidney stones  Musculoskeletal: no joint pain, swelling , stiffness  Endocrine: no polyuria, polydypsia, no cold or heat intolerence  Hematology: no anemia, no easy brusing or bleeding, no hx of clotting disorder  Dermatology: no skin rash  Psychiatry: no depression, no anxiety  Neurology: no syncope, no seizures, no numbness or tingling of hands, no numbness or tingling of feet, no paresis        Vitals:   Vitals:    02/08/23 0838   BP: 119/74   Pulse: 75   Resp: 16   Temp: 97.5 °F (36.4 °C)   SpO2: 99%      BMI: Body mass index is 27.63 kg/m².     Physical Exam:  General Appearance: alert and oriented to person, place and time, in no acute distress  Cardiovascular: normal rate, regular rhythm, normal S1 and S2, no murmurs, rubs, clicks, or gallops, distal pulses intact,   Pulmonary/Chest: clear to auscultation bilaterally- no wheezes, rales or rhonchi, normal air movement, no respiratory distress  Abdomen: soft, non-tender, non-distended, normal bowel sounds  Extremities: no cyanosis, clubbing or edema  Skin: warm and dry, no rash   Head: normocephalic and atraumatic  Eyes: pupils equal, round, and reactive to light  Neck: supple and non-tender without mass, no thyromegaly   Musculoskeletal: normal range of motion, no joint swelling, deformity or tenderness  Neurological: alert, oriented, normal speech, no focal findings or movement disorder noted    Review of Labs and Diagnostic Testing:    Recent Results (from the past 24 hour(s))   EKG 12 lead    Collection Time: 02/07/23 11:21 AM   Result Value Ref Range    Ventricular Rate 73 BPM    Atrial Rate 73 BPM    P-R Interval 144 ms    QRS Duration 86 ms    Q-T Interval 376 ms    QTc Calculation (Bazett) 414 ms    P Axis 64 degrees    R Axis 51 degrees    T Axis 56 degrees   Troponin    Collection Time: 02/07/23 11:24 AM   Result Value Ref Range    Troponin, High Sensitivity 58 (HH) 0 - 22 ng/L   Basic Metabolic Panel    Collection Time: 02/07/23 11:24 AM   Result Value Ref Range    Glucose 101 (H) 70 - 99 mg/dL    BUN 3 (L) 6 - 20 mg/dL    Creatinine 0.58 (L) 0.70 - 1.20 mg/dL    Est, Glom Filt Rate >60 >60 mL/min/1.73m2    Bun/Cre Ratio 5 (L) 9 - 20    Calcium 9.2 8.6 - 10.4 mg/dL    Sodium 128 (L) 135 - 144 mmol/L    Potassium 4.1 3.7 - 5.3 mmol/L    Chloride 93 (L) 98 - 107 mmol/L    CO2 21 20 - 31 mmol/L    Anion Gap 14 9 - 17 mmol/L   CBC with Auto Differential    Collection Time: 02/07/23 12:58 PM   Result Value Ref Range    WBC 7.4 3.5 - 11.0 k/uL    RBC 4.99 4.5 - 5.9 m/uL    Hemoglobin 15.4 13.5 - 17.5 g/dL    Hematocrit 44.5 41 - 53 %    MCV 89.2 80 - 100 fL    MCH 30.9 26 - 34 pg    MCHC 34.6 31 - 37 g/dL    RDW 14.4 12.1 - 15.2 %    Platelets 790 830 - 135 k/uL    Differential Type YES     Seg Neutrophils 77 (H) 39 - 75 %    Lymphocytes 10 (L) 13 - 44 %    Monocytes 9 5 - 9 %    Eosinophils % 3 0 - 5 %    Basophils 1 0 - 2 %    Segs Absolute 5.70 2.1 - 6.5 k/uL    Absolute Lymph # 0.80 (L) 1.0 - 4.8 k/uL    Absolute Mono # 0.70 0.0 - 1.0 k/uL    Absolute Eos # 0.20 0.0 - 0.4 k/uL    Basophils Absolute 0.10 0.0 - 0.2 k/uL   Basic Metabolic Panel    Collection Time: 02/07/23 12:58 PM   Result Value Ref Range    Glucose 99 70 - 99 mg/dL    BUN 3 (L) 6 - 20 mg/dL    Creatinine 0.64 (L) 0.70 - 1.20 mg/dL    Est, Glom Filt Rate >60 >60 mL/min/1.73m2    Bun/Cre Ratio 5 (L) 9 - 20    Calcium 9.1 8.6 - 10.4 mg/dL    Sodium 126 (L) 135 - 144 mmol/L    Potassium 3.7 3.7 - 5.3 mmol/L    Chloride 89 (L) 98 - 107 mmol/L    CO2 23 20 - 31 mmol/L    Anion Gap 14 9 - 17 mmol/L   Troponin    Collection Time: 02/07/23 12:58 PM   Result Value Ref Range    Troponin, High Sensitivity 55 (HH) 0 - 22 ng/L   Hepatic Function Panel    Collection Time: 02/07/23 12:58 PM   Result Value Ref Range    Albumin 4.5 3.5 - 5.2 g/dL    Alkaline Phosphatase 72 40 - 129 U/L    ALT 44 (H) 5 - 41 U/L    AST 51 (H) <40 U/L    Total Bilirubin 0.6 0.3 - 1.2 mg/dL    Bilirubin, Direct 0.2 <0.3 mg/dL    Bilirubin, Indirect 0.4 0.0 - 1.0 mg/dL    Total Protein 7.1 6.4 - 8.3 g/dL   TSH with Reflex    Collection Time: 02/07/23 12:58 PM   Result Value Ref Range    TSH 0.92 0.30 - 5.00 uIU/mL   Magnesium    Collection Time: 02/07/23 12:58 PM   Result Value Ref Range    Magnesium 2.2 1.6 - 2.6 mg/dL   EKG 12 Lead    Collection Time: 02/07/23  1:04 PM   Result Value Ref Range    Ventricular Rate 92 BPM    Atrial Rate 92 BPM    P-R Interval 150 ms    QRS Duration 76 ms    Q-T Interval 354 ms    QTc Calculation (Bazett) 437 ms    P Axis 68 degrees    R Axis 57 degrees    T Axis 57 degrees   COVID-19, Rapid    Collection Time: 02/07/23  3:14 PM    Specimen: Nasopharyngeal Swab   Result Value Ref Range    Specimen Description . NASOPHARYNGEAL SWAB     SARS-CoV-2, Rapid Not Detected Not Detected   Troponin    Collection Time: 02/07/23  4:21 PM   Result Value Ref Range    Troponin, High Sensitivity 37 (H) 0 - 22 ng/L   Ethanol    Collection Time: 02/07/23  4:21 PM   Result Value Ref Range    Ethanol <10 <10 mg/dL    Ethanol percent <0.010 %   Troponin    Collection Time: 02/07/23  7:29 PM   Result Value Ref Range    Troponin, High Sensitivity 37 (H) 0 - 22 ng/L   EKG 12 lead    Collection Time: 02/08/23  5:06 AM   Result Value Ref Range    Ventricular Rate 64 BPM    Atrial Rate 64 BPM    P-R Interval 144 ms    QRS Duration 88 ms    Q-T Interval 438 ms    QTc Calculation (Bazett) 451 ms    P Axis 35 degrees    R Axis 17 degrees    T Axis 48 degrees   CBC    Collection Time: 02/08/23  5:45 AM   Result Value Ref Range    WBC 5.1 3.5 - 11.0 k/uL    RBC 4.79 4.5 - 5.9 m/uL    Hemoglobin 14.6 13.5 - 17.5 g/dL    Hematocrit 43.5 41 - 53 %    MCV 90.9 80 - 100 fL    MCH 30.4 26 - 34 pg    MCHC 33.5 31 - 37 g/dL    RDW 14.2 12.1 - 15.2 %    Platelets 830 004 - 402 k/uL   Basic Metabolic Panel w/ Reflex to MG    Collection Time: 02/08/23  5:45 AM   Result Value Ref Range    Glucose 101 (H) 70 - 99 mg/dL    BUN 5 (L) 6 - 20 mg/dL    Creatinine 0.68 (L) 0.70 - 1.20 mg/dL    Est, Glom Filt Rate >60 >60 mL/min/1.73m2    Bun/Cre Ratio 7 (L) 9 - 20    Calcium 8.9 8.6 - 10.4 mg/dL    Sodium 131 (L) 135 - 144 mmol/L    Potassium 4.2 3.7 - 5.3 mmol/L    Chloride 95 (L) 98 - 107 mmol/L    CO2 26 20 - 31 mmol/L    Anion Gap 10 9 - 17 mmol/L   Magnesium    Collection Time: 02/08/23  5:45 AM   Result Value Ref Range    Magnesium 2.5 1.6 - 2.6 mg/dL       Radiology:     XR CHEST PORTABLE    Result Date: 2/7/2023       Medical Decision Making (MDM) Data:    External documents reviewed: My CXR interpretation:   My EKG interpretation:   Discussed with: Cardiology Dr. Ben Nails physician Dr. Tejal Martinezhal  Tests considered but not ordered:    Heart score:    Social Determinants of Health that impact treatment or disposition:          Assessment and Plan     1.   Hypertensive urgency - blood pressure improved after adding Cardura 2 mg/day, Lopressor 25 mg twice daily by Dr. Swati Whitfield. Stop Norvasc, continue on lisinopril and HCTZ, closely monitor vitals, blood pressure improved. 2.  Chest discomfort/pinching -patient's EKG revealed no acute changes, troponin was elevated and trending down. Most likely secondary to heart strain. Awaiting echo and stress test today. Appreciate Dr. Ed Dolan help  3. Mild hyponatremia -most likely secondary to HCTZ, will need to be monitored in the future. TSH was normal  4. History of prediabetes with hemoglobin A1c 6.3%, follow-up closely with PCP  5. Hyperlipidemia  6. Mildly elevated transaminases most likely secondary to alcohol consumption. Patient advised to quit drinking. Differential Diagnosis: Hypertensive urgency, ACS,  Condition is improving / unchanged / worsening: Improving  Condition is a treatment goal: Yes  Chronic condition is / is not having mild / moderate, severe exacerbation, progression or side effects of treatment: Worsening hypertension    Shared decision making:      Code status and discussions: Full code    Medical Necessity:  Inpatient is appropriate for this patient due to need to close monitoring of blood pressure, adjusting medications, cardiology evaluation for elevated troponin, stress test, echo    Estimated length of stay: 1-2 days      The beneficiary may reasonably be expected to be discharged or transferred to a hospital within 96 hours after admission.       Patient Active Problem List   Diagnosis Code    Essential hypertension I10    Mixed hyperlipidemia E78.2    Primary insomnia F51.01    Bilateral lower extremity edema R60.0    Elevated LFTs R79.89    Hypertensive urgency I16.0       DVT prophylaxis:   [x] Lovenox   [] SCDs   [] SQ Heparin   [] Encourage ambulation, low risk for DVT, no chemical or mechanical    prophylaxis necessary      [] Already on Anticoagulation      Documentation of the Current Medications in the Medical Record    ( x)  I have utilized all available immediate resources to obtain, update, or review the patient's current medications (including all prescriptions, over-the-counter products, herbals, cannabis / cannabidiol products, vitamin / mineral / dietary (nutritional) supplements). (Satisfies MIPS Performance)  If Yes, Stop Here  ( )  The patient is not eligible for medication reconciliation; the patient is in an emergent medical situation where delaying treatment would jeopardize the patient's health. (MIPS Performance exception / exclusion)  ( )  I did not confirm, update or review the patient's current list of medications today. (Does not satisfy MIPS Performance)        Advanced Care Plan    ( x)  I confirmed that the patient's Advanced Care Plan is present, code status documented, or surrogate decision maker is listed in the patient's medical record. ( )  The patient's advanced care plan is not present because:  (select)   ( ) I confirmed today that the patient does not wish or was not able to name a surrogate decision maker or provide an [address] ( ) Hospice care is currently being provided or has been provided this calender year. ( )  I did not confirm today the presence of an [address] or surrogate decision maker documented within the patient's medical record. (Does not satisfy MIPS performance).             Dean Prater MD, MD  Admitting Hospitalist

## 2023-02-08 NOTE — PROGRESS NOTES
New orders received from Dr. Alonzo Zhang regarding patient sleep issue. See MAR for more information. Patient requests chips to eat and a refill on his water at this time. No further needs.

## 2023-02-08 NOTE — PROGRESS NOTES
RN case manager and SW met with pt to complete assessment. Pt is alert and oriented and cooperative with assessment. Pt is a 47year old  male admitted for hypertensive urgency. Pt lives with his wife, 2 children , and his mother in their home in [facility]. Pt has a BP cuff to use at home but no other DME. Pt was not using any community services prior to admission. Pt drives and is daphne to get himself to appointments without concerns. Pt is a full code and follows with Dr Areli Jimenez as PCP. Pt does not have advance directives but reports that his wife would be his decision maker if needed. ACP note completed with pt. Pt reports that his medications are affordable. Pt plans to return home with his family at discharge. Pt identifies no discharge needs or concerns at this time. Pt may be able to return home today after testing. SW will remain available as needed.  María Thompson MSW LSW 2/8/2023

## 2023-02-08 NOTE — PROGRESS NOTES
Cardiology    He responded to antihypertensive medication with brief hypotension. I think the alpha and beta blockers with Lisinopril/hctz will control pressure. May need to titrate. Have stopped the Norvasc and lasix at this time, although if pressure unable to be controlled, Norvasc would be a good addition. Echo and Lexiscan pending. Trop trending downwards. No c/o chest pain.     Thanks, Adalid Hickey MD

## 2023-02-08 NOTE — PLAN OF CARE
Problem: Discharge Planning  Goal: Discharge to home or other facility with appropriate resources  Outcome: Progressing     Problem: Pain  Goal: Verbalizes/displays adequate comfort level or baseline comfort level  2/8/2023 0433 by Albania Strong RN  Outcome: Progressing

## 2023-02-08 NOTE — PROGRESS NOTES
Pt discharged to home with all documented to private vehicle; discharge instructions complete and pt denies any questions at this time; verbalizes understanding.

## 2023-02-08 NOTE — ACP (ADVANCE CARE PLANNING)
Advance Care Planning     Advance Care Planning Activator (Inpatient)  Conversation Note      Date of ACP Conversation: 2/8/2023     Conversation Conducted with: Patient with Decision Making Capacity    ACP Activator: Candace Gilman, [address] [facility] Decision Maker:     Current Designated Health Care Decision Maker:     Primary Decision Maker: Nayeli Zayas - [phone]  Click here to complete Healthcare Decision Makers including section of the Healthcare Decision Maker Relationship (ie \"Primary\")  Today we documented Decision Maker(s) consistent with Legal Next of Kin hierarchy. Care Preferences    Ventilation: \"If you were in your present state of health and suddenly became very ill and were unable to breathe on your own, what would your preference be about the use of a ventilator (breathing machine) if it were available to you? \"      Would the patient desire the use of ventilator (breathing machine)?: yes    \"If your health worsens and it becomes clear that your chance of recovery is unlikely, what would your preference be about the use of a ventilator (breathing machine) if it were available to you? \"     Would the patient desire the use of ventilator (breathing machine)?: \"Family to decide\"      Resuscitation  \"CPR works best to restart the heart when there is a sudden event, like a heart attack, in someone who is otherwise healthy. Unfortunately, CPR does not typically restart the heart for people who have serious health conditions or who are very sick. \"    \"In the event your heart stopped as a result of an underlying serious health condition, would you want attempts to be made to restart your heart (answer \"yes\" for attempt to resuscitate) or would you prefer a natural death (answer \"no\" for do not attempt to resuscitate)? \" yes       [] Yes   [x] No   Educated Patient / Camazael Alarcons regarding differences between Advance Directives and portable DNR orders.     Length of ACP Conversation in minutes:  5    Conversation Outcomes:  [x] ACP discussion completed  [] Existing advance directive reviewed with patient; no changes to patient's previously recorded wishes  [] New Advance Directive completed  [] Portable Do Not Rescitate prepared for Provider review and signature  [] POLST/POST/MOLST/MOST prepared for Provider review and signature      Follow-up plan:    [] Schedule follow-up conversation to continue planning  [] Referred individual to Provider for additional questions/concerns   [] Advised patient/agent/surrogate to review completed ACP document and update if needed with changes in condition, patient preferences or care setting    [x] This note routed to one or more involved healthcare providers

## 2023-02-08 NOTE — DISCHARGE SUMMARY
Hospitalist Discharge Summary    Patient:  Earnestine Howell  YOB: 1970    MRN: [id number]   Acct: [de-identified]    Primary Care Physician: Pau Casiano DO    Admit date:  2/7/2023    Discharge date:  2/8/2023       Discharge Diagnoses: 1. Hypertensive urgency  2. Chest discomfort/pinching  3. Mild Hyponatremia  4. Hyperlipidemia  5. H/O Prediabetes  6. Mildly elevated transaminases on LFT    Discharge Medications:         Medication List        START taking these medications      amoxicillin 500 MG capsule  Commonly known as: AMOXIL  Take 1 capsule by mouth 2 times daily for 7 days     doxazosin 2 MG tablet  Commonly known as: CARDURA  Take 1 tablet by mouth daily  Start taking on: February 9, 2023     metoprolol tartrate 25 MG tablet  Commonly known as: LOPRESSOR  Take 1 tablet by mouth 2 times daily            CONTINUE taking these medications      aspirin 81 MG EC tablet     atorvastatin 10 MG tablet  Commonly known as: LIPITOR  Take 1 tablet by mouth daily     cimetidine 300 MG tablet  Commonly known as: TAGAMET     lisinopril-hydroCHLOROthiazide 20-12.5 MG per tablet  Commonly known as: PRINZIDE;ZESTORETIC  Take 2 tablets by mouth daily     melatonin 5 MG Tabs tablet            STOP taking these medications      amLODIPine 5 MG tablet  Commonly known as: NORVASC     furosemide 20 MG tablet  Commonly known as: LASIX     neomycin-polymyxin-hydrocortisone [id number] otic solution  Commonly known as: CORTISPORIN               Where to Get Your Medications        These medications were sent to [address] [place] [address] ,   [facility] [address]      Phone: [phone]   doxazosin 2 MG tablet  metoprolol tartrate 25 MG tablet         Diet:  ADULT DIET;  Regular    Activity:  Activity as tolerated     Follow-up:  in 1 -2 weeks with Pau Casiano DO,  follow up with Dr. Symone Schafer in about 4 weeks      Consultants:  cardiology Dr. Gonsalez    CBC:   Recent Labs     02/08/23  0545   WBC 5.1   HGB 14.6        BMP:    Recent Labs     02/08/23  0545   *   K 4.2   CL 95*   CO2 26   BUN 5*   CREATININE 0.68*   GLUCOSE 101*     Calcium:  Recent Labs     02/08/23  0545   CALCIUM 8.9     Magnesium:  Recent Labs     02/08/23  0545   MG 2.5     Physical Exam:    Vitals:Patient Vitals for the past 24 hrs:   BP Temp Temp src Pulse Resp SpO2 Height   02/08/23 1529 -- 98.6 °F (37 °C) Temporal 74 16 100 % --   02/08/23 1527 (!) 140/86 -- -- -- -- -- --   02/08/23 1408 135/79 -- -- 87 -- -- --   02/08/23 1407 136/80 -- -- 89 -- -- --   02/08/23 1406 (!) 142/76 -- -- 89 -- -- --   02/08/23 1405 (!) 146/73 -- -- 94 -- -- --   02/08/23 1404 (!) 165/78 -- -- (!) 105 -- -- --   02/08/23 1403 (!) 174/78 -- -- (!) 115 -- -- --   02/08/23 1351 (!) 147/95 -- -- 65 -- -- --   02/08/23 1202 124/85 98 °F (36.7 °C) Oral 67 16 100 % --   02/08/23 1040 -- -- -- -- 16 98 % --   02/08/23 0838 119/74 97.5 °F (36.4 °C) Oral 75 16 99 % --   02/08/23 0752 -- -- -- -- -- -- 5' 6\" (1.676 m)   02/08/23 0510 -- -- -- -- -- 99 % --   02/08/23 0400 113/75 97.9 °F (36.6 °C) Temporal 58 16 100 % --   02/08/23 0015 90/70 -- -- -- -- -- --   02/08/23 0005 (!) 81/51 98.4 °F (36.9 °C) Temporal -- 18 97 % --   02/07/23 2027 127/88 -- -- 94 -- -- --   02/07/23 2004 -- -- -- -- -- 98 % --   02/07/23 1937 127/88 99.5 °F (37.5 °C) Temporal -- 18 98 % --   02/07/23 1630 -- -- -- -- -- 98 % --     Weight: Weight: 171 lb 3.2 oz (77.7 kg)     24 hour intake/output:  Intake/Output Summary (Last 24 hours) at 2/8/2023 1621  Last data filed at 2/8/2023 1030  Gross per 24 hour   Intake 1350 ml   Output 3100 ml   Net -1750 ml         Hospital Course:      The patient is a 52 y.o. male presented to the emergency room for evaluation of elevated blood pressure, chest discomfort during his routine office visit. Per  patient about 3 months ago he used his mother's blood pressure  machine to check his blood pressure and it was elevated around 180-200/. He had no symptoms at that time. He went to see Morena Peoples CNP and apparently was started on Lasix which did not help to lower his blood pressure. He switched his care to Dr. Ramesh Guthrie and recently was prescribed lisinopril and HCTZ. Subsequently his lisinopril dose was increased due to uncontrolled hypertension. Yesterday the patient went to see him again for routine follow-up and also for earache. Patient states that his blood pressure was very high and he was also having some \"pinching\" sensation in the left upper chest.  The patient was sent to the emergency room for evaluation. Patient states that chest discomfort has been present for more than a week almost on a daily basis, lasting couple of hours and resolving spontaneously. He states it's mild. Pain is nonradiating. He has no associated shortness of breath, dizziness, diaphoresis, nausea/vomiting. Patient has a strong family history of CAD. He smokes couple of cigarettes a day and drinks 2 shots of whiskey every evening. Patient's work-up in the emergency room revealed elevated /116 with heart rate 96, RR 18. Oxygen saturation was 99% on room air. BMP revealed sodium 128, potassium 4.1, chloride 93, BUN 3, creatinine 1.58. Initial troponin was 58 with repeat level 55 and then 37. LFTs showed mildly elevated ALT 44 and AST 51. Random glucose was 99. CBC essentially normal.  COVID-19 was negative. Chest x-ray revealed no acute cardiopulmonary process. EKG showed normal sinus rhythm with rate of 92, nonspecific ST and T wave abnormalities. Patient was evaluated by Dr. Laurie Whaley in the emergency room and recommended admission for further work-up with echo, stress test and adjusting medications for uncontrolled hypertension. This morning the patient has no complaints. He underwent Lexiscan and the result was unremarkable.  2D echo was normal with EF 60 %, mild TR. Dr. Bruno Moreno adjusted BP meds by stopping Amlodipine and Lasix and adding Cardura and Lopressor. His  blood pressure improved. Will discharge home and recommend follow up with PCP in 1-2 wks and Dr. Bruno Moreno in about 4 weeks       1. Hypertensive urgency - blood pressure improved after adding Cardura 2 mg/day, Lopressor 25 mg twice daily by Dr. Bruno Moreno. Stop Norvasc and Lasix. Continue on lisinopril and HCTZ  2. Chest discomfort/pinching -patient's EKG revealed no acute changes, troponin was elevated and trended down. Most likely secondary to heart strain. Lexiscan stress test essentially normal, echo normal   3. Mild hyponatremia -most likely secondary to HCTZ, will need to be monitored in the future. TSH was normal  4. History of prediabetes with hemoglobin A1c 6.3%, follow-up closely with PCP  5. Hyperlipidemia, on Lipitor  6. Mildly elevated transaminases most likely secondary to alcohol consumption. Patient advised to quit drinking.       Disposition: home    Condition: Stable    Time Spent: 32 minutes      Electronically signed by Eloina Dan MD on 2/8/2023 at 4:21 PM  Discharging Hospitalist

## 2023-02-08 NOTE — PROGRESS NOTES
Nutrition Assessment     Type and Reason for Visit: Initial, Positive Nutrition Screen (MSTT1)    Nutrition Recommendations/Plan:   Continue NPO diet. Progress to low fat, low cholesterol, high fiber, SUN diet as medically appropriate. Malnutrition Assessment:  Malnutrition Status: At risk for malnutrition (Comment)    Nutrition Assessment:  Inadequate oral intakes r/t cardiac dysfunction aeb NPO. Pt admitted with chest pain. No BEEF or PORK due to [facility] preferences. A1c 5.8/ pre-diabetes range. Na 131, K+ 4.2, renal indices low. Limits carbs to 125 g daily, typically only eats once daily. Pt encouraged to eat 3 meals per day. Pt states he has lost weight intentionally over the last 3 years. He declined any diet education needs.       Nutrition Related Findings:   appears well nourished Wound Type: None    Current Nutrition Therapies:    Diet NPO    Anthropometric Measures:  Height: 5' 6\" (167.6 cm)  Current Body Wt: 171 lb 3.2 oz (77.7 kg)   BMI: 27.6    Nutrition Diagnosis:   Inadequate oral intake related to cardiac dysfunction as evidenced by NPO or clear liquid status due to medical condition    Nutrition Interventions:   Food and/or Nutrient Delivery: Continue NPO  Nutrition Education/Counseling: Education declined  Coordination of Nutrition Care: Continue to monitor while inpatient  Plan of Care discussed with: Patient    Goals:     Goals: Initiate PO diet     Recent Labs     02/07/23  1124 02/07/23  1258 02/08/23  0545   * 126* 131*   K 4.1 3.7 4.2   CL 93* 89* 95*   CO2 21 23 26   BUN 3* 3* 5*   CREATININE 0.58* 0.64* 0.68*   GLUCOSE 101* 99 101*   ALT  --  44*  --    ALKPHOS  --  72  --       Lab Results   Component Value Date/Time    LABALBU 4.5 02/07/2023 12:58 PM       Lab Results   Component Value Date/Time    LABA1C 5.8 12/14/2022 09:26 AM      Lab Results   Component Value Date/Time    TRIG 50 12/14/2022 09:26 AM    HDL 71 12/14/2022 09:26 AM        Nutrition Monitoring and Evaluation:   Behavioral-Environmental Outcomes: Beliefs and Attitutes  Food/Nutrient Intake Outcomes: Diet Advancement/Tolerance  Physical Signs/Symptoms Outcomes: Biochemical Data, Weight    Discharge Planning:     Too soon to determine     Kd Watkins, [address] [place] [address],   Contact: [phone]

## 2023-02-08 NOTE — TELEPHONE ENCOUNTER
Care Transitions Initial Follow Up Call    Outreach made within 2 business days of discharge: Yes    Patient: Siria Mcgill Patient : 1970   MRN: [id number]  Reason for Admission: Hypertensive urgency  Discharge Date:    2022     Spoke with: pt    Discharge department/facility: [facility] Interactive Patient Contact:  Was patient able to fill all prescriptions: Yes  Was patient instructed to bring all medications to the follow-up visit: Yes  Is patient taking all medications as directed in the discharge summary?  Yes  Does patient understand their discharge instructions: Yes  Does patient have questions or concerns that need addressed prior to 7-14 day follow up office visit: no    Scheduled appointment with PCP within 7-14 days    Follow Up  Future Appointments   Date Time Provider Lars iPerce   2023  9:40 AM Derrek Ortiz DO [facility]   2/15/2023  9:00 AM Derrek Ortiz, [address]

## 2023-02-08 NOTE — CARE COORDINATION
Case Management Assessment  Initial Evaluation    Date/Time of Evaluation: 2/8/2023 10:17 AM  Assessment Completed by: Van Bamberger, RN    If patient is discharged prior to next notation, then this note serves as note for discharge by case management. Patient Name: Ricardo Martínez                   YOB: 1970  Diagnosis: Primary hypertension [I10]  Hypertensive urgency [I16.0]  Chest pain, unspecified type [R07.9]                   Date / Time: 2/7/2023 12:31 PM    Patient Admission Status: Inpatient   Readmission Risk (Low < 19, Mod (19-27), High > 27): Readmission Risk Score: 5.2    Current PCP: Mavis Bonner, DO  PCP verified by CM? (P) Yes (Dr. Thaddeus Serrato)    Chart Reviewed: Yes      History Provided by: (P) Patient  Patient Orientation: (P) Alert and Oriented, Person, Place, Situation, Self    Patient Cognition: (P) Alert    Hospitalization in the last 30 days (Readmission):  No    If yes, Readmission Assessment in CM Navigator will be completed.     Advance Directives:      Code Status: Full Code   Patient's Primary Decision Maker is: (P) Legal Next of Kin    Primary Decision MakerJorge Luis Nogueira Spouse - [phone]    Discharge Planning:    Patient lives with: (P) Spouse/Significant Other, Children, Parent Type of Home: (P) House  Primary Care Giver: (P) Self  Patient Support Systems include: (P) Spouse/Significant Other, Parent, Children   Current Financial resources: (P) Medicaid  Current community resources: (P) None  Current services prior to admission: (P) None            Current DME:              Type of Home Care services:  (P) None    ADLS  Prior functional level: (P) Independent in ADLs/IADLs  Current functional level: (P) Independent in ADLs/IADLs    PT AM-PAC:   /24  OT AM-PAC:   /24    Family can provide assistance at DC: (P) Yes  Would you like Case Management to discuss the discharge plan with any other family members/significant others, and if so, who? (P) No  Plans to Return to Present Housing: (P) Yes  Other Identified Issues/Barriers to RETURNING to current housing: none at this time  Potential Assistance needed at discharge: (P) N/A            Potential DME:  none  Patient expects to discharge to: (P) [facility] for transportation at discharge:      Financial    Payor: [employer] / Plan: Faustina Whaley / Product Type: *No Product type* /     Does insurance require precert for SNF: Yes    Potential assistance Purchasing Medications: (P) No  [facility]        [facility] [place] [facility] [address] [facility] [address]  Phone: [phone] Fax: [phone]      Notes:    Factors facilitating achievement of predicted outcomes: Family support, Pleasant, and Good insight into deficits    Barriers to discharge: none    Additional Case Management Notes: pt lives with wife, children and his mother. Plans to return home at discharge and denies needs at this time. The Plan for Transition of Care is related to the following treatment goals of Primary hypertension [I10]  Hypertensive urgency [I16.0]  Chest pain, unspecified type [X74.8]    IF APPLICABLE: The Patient and/or patient representative Keron and his family were provided with a choice of provider and agrees with the discharge plan. Freedom of choice list with basic dialogue that supports the patient's individualized plan of care/goals and shares the quality data associated with the providers was provided to: (P) Patient   Patient Representative Name:       The Patient and/or Patient Representative Agree with the Discharge Plan?  (P) Yes    Naina Echols RN  Case Management Department  Ph: [phone] Fax: [phone]

## 2023-02-09 ENCOUNTER — TELEPHONE (OUTPATIENT)
Dept: FAMILY MEDICINE CLINIC | Age: 53
End: 2023-02-09

## 2023-02-09 LAB
ALDOSTERONE COMMENT: NORMAL
ALDOSTERONE: 6 NG/DL

## 2023-02-09 RX ORDER — OMEPRAZOLE 40 MG/1
CAPSULE, DELAYED RELEASE ORAL
COMMUNITY
Start: 2023-02-04

## 2023-02-09 NOTE — TELEPHONE ENCOUNTER
Pt called stating the pharmacy canceled his ear drops, will called the pharmacy after lunch.        Amoxicillin 500 mg was sent in will call to verify

## 2023-02-09 NOTE — PROCEDURES
[place] [address]                              CARDIAC STRESS TEST    PATIENT NAME: Elena Payton                 :        1970  MED REC NO:   [id number]                              ROOM:       0472  ACCOUNT NO:   [de-identified]                           ADMIT DATE: 2023  PROVIDER:     Dayana Garcia MD    DATE OF STUDY:  2023    NAME OF TEST:  Demetrio Valenzuela _____ stress test.    INDICATION:  Chest pain, abnormal EKG. IMPRESSION:  1. We gave 0.4 mg of Lexiscan intravenously. 2.  This was followed in 20 seconds by Cardiolite infusion. 3.  There was no chest pain. 4.  There was no ST depression. 5.  It was an overall negative Lexiscan stress test.  6.  Cardiolite to follow.         Felisha Charlton MD    D: 2023 14:33:38       T: 2023 14:49:16     [id number]  Job#: [id number]     Doc#: [id number]    CC:

## 2023-02-09 NOTE — PROCEDURES
[place] [address]                              CARDIAC STRESS TEST    PATIENT NAME: Justice Paredes                 :        1970  MED REC NO:   [id number]                              ROOM:       4182  ACCOUNT NO:   [de-identified]                           ADMIT DATE: 2023  PROVIDER:     Woodroe Mohs      DATE OF STUDY:  2023    Cardiovascular Diagnostics Department    Ordering Provider:  Tee Cordero MD    Primary Care Provider:  Brain Turner. Iglesia Jennings DO    Interpreting Physician:   Woodroe Mohs, MD    MYOCARDIAL PERFUSION STRESS IMAGING    The stress ECG results are reported separately. NUCLEAR IMAGING RESULTS:  The overall quality of the study is excellent. Mild attenuation artifact was seen. There is no evidence of abnormal  lung uptake. Additionally, the right ventricle appears normal.  The  left ventricular cavity is noted to be normal in size on stress images. There is no evidence of transient ischemic dilatation (TID) of the left  ventricle. Gated SPECT imaging reveals normal myocardial thickening and wall motion  with a calculated left ventricular ejection fraction (EF) of 72%. The rest images demonstrate homogenous tracer distribution throughout  the myocardium. SPECT images demonstrate homogenous tracer distribution throughout the  myocardium. IMPRESSION:  1. Normal myocardial perfusion imaging, without evidence of significant  myocardial ischemia or infarction. 2.  Global left ventricular systolic function was normal, without  regional wall motion abnormalities. Overall these results are most consistent with a low risk for  significant coronary artery disease. Yen Piña    D: 2023 10:56:02       T: 2023 10:57:41     [email or web address]  Job#: [id number]     Doc#: Unknown    CC:  MD Brain Mcfarland. Iglesia Maresung, DO

## 2023-02-11 LAB
CREATININE URINE /24 HR: NORMAL MG/D (ref 800–2100)
CREATININE URINE /VOLUME: 23 MG/DL
HOURS COLLECTED: NORMAL
METANEPHRINE UF INTERPRETATION: NORMAL
METANEPHRINE UG/G CRE: 174 UG/G CRT (ref 0–300)
METANEPHRINES 24 HOUR URINE: NORMAL UG/D (ref 55–320)
METANEPHRINES, URINE (UMOL/L): 40 UG/L
NORMETANEPHRINE, (G/CRT): 352 UG/G CRT (ref 0–400)
NORMETANEPHRINE, (NMOL/DAY): NORMAL UG/D (ref 114–865)
NORMETANEPHRINES, NMOL/L: 81 UG/L
RENIN ACTIVITY: 0.7 NG/ML/HR
RENIN COMMENT: NORMAL
URINE VOLUME: NORMAL

## 2023-02-15 ENCOUNTER — OFFICE VISIT (OUTPATIENT)
Dept: FAMILY MEDICINE CLINIC | Age: 53
End: 2023-02-15
Payer: MEDICAID

## 2023-02-15 VITALS
HEART RATE: 73 BPM | BODY MASS INDEX: 27.44 KG/M2 | WEIGHT: 170 LBS | DIASTOLIC BLOOD PRESSURE: 80 MMHG | SYSTOLIC BLOOD PRESSURE: 128 MMHG | OXYGEN SATURATION: 100 %

## 2023-02-15 DIAGNOSIS — R51.9 ACUTE INTRACTABLE HEADACHE, UNSPECIFIED HEADACHE TYPE: ICD-10-CM

## 2023-02-15 DIAGNOSIS — H53.8 BLURRY VISION, BILATERAL: ICD-10-CM

## 2023-02-15 DIAGNOSIS — I10 ESSENTIAL HYPERTENSION: Primary | ICD-10-CM

## 2023-02-15 DIAGNOSIS — G47.00 INSOMNIA, UNSPECIFIED TYPE: ICD-10-CM

## 2023-02-15 PROCEDURE — 3017F COLORECTAL CA SCREEN DOC REV: CPT | Performed by: STUDENT IN AN ORGANIZED HEALTH CARE EDUCATION/TRAINING PROGRAM

## 2023-02-15 PROCEDURE — G8484 FLU IMMUNIZE NO ADMIN: HCPCS | Performed by: STUDENT IN AN ORGANIZED HEALTH CARE EDUCATION/TRAINING PROGRAM

## 2023-02-15 PROCEDURE — 1111F DSCHRG MED/CURRENT MED MERGE: CPT | Performed by: STUDENT IN AN ORGANIZED HEALTH CARE EDUCATION/TRAINING PROGRAM

## 2023-02-15 PROCEDURE — G8427 DOCREV CUR MEDS BY ELIG CLIN: HCPCS | Performed by: STUDENT IN AN ORGANIZED HEALTH CARE EDUCATION/TRAINING PROGRAM

## 2023-02-15 PROCEDURE — 3074F SYST BP LT 130 MM HG: CPT | Performed by: STUDENT IN AN ORGANIZED HEALTH CARE EDUCATION/TRAINING PROGRAM

## 2023-02-15 PROCEDURE — 99214 OFFICE O/P EST MOD 30 MIN: CPT | Performed by: STUDENT IN AN ORGANIZED HEALTH CARE EDUCATION/TRAINING PROGRAM

## 2023-02-15 PROCEDURE — 4004F PT TOBACCO SCREEN RCVD TLK: CPT | Performed by: STUDENT IN AN ORGANIZED HEALTH CARE EDUCATION/TRAINING PROGRAM

## 2023-02-15 PROCEDURE — 3079F DIAST BP 80-89 MM HG: CPT | Performed by: STUDENT IN AN ORGANIZED HEALTH CARE EDUCATION/TRAINING PROGRAM

## 2023-02-15 PROCEDURE — G8419 CALC BMI OUT NRM PARAM NOF/U: HCPCS | Performed by: STUDENT IN AN ORGANIZED HEALTH CARE EDUCATION/TRAINING PROGRAM

## 2023-02-15 RX ORDER — HYDROXYZINE 50 MG/1
50 TABLET, FILM COATED ORAL NIGHTLY
Qty: 30 TABLET | Refills: 0 | Status: SHIPPED | OUTPATIENT
Start: 2023-02-15 | End: 2023-03-17

## 2023-02-15 ASSESSMENT — ENCOUNTER SYMPTOMS
BACK PAIN: 0
COUGH: 0
SORE THROAT: 0
SINUS PAIN: 0
DIARRHEA: 0
VOMITING: 0
WHEEZING: 0
EYE REDNESS: 1
ABDOMINAL PAIN: 0
NAUSEA: 0

## 2023-02-15 NOTE — PROGRESS NOTES
HPI Notes    Name: Roberto Mahan  : 1970         Chief Complaint:     Chief Complaint   Patient presents with    Follow-Up from Hospital     Patient was seen at ED on  with complaint of HTN and chest discomfort. Patient states he has had headaches, difficulty sleeping and redness of eyes since change of medication       History of Present Illness:      HPI    This is a 60-year-old man with essential hypertension recently treated in the hospital for hypertensive emergency and he has undergone subsequent outpatient cardiac work-up. Lexiscan stress test negative, overall healthy appearing echocardiogram.    He presents with a list of his blood pressure since that time, which are greatly improved. However, he is now complaining of headaches, difficulty sleeping and redness in the eyes. He reports his vision is more blurry, and he has a more bothersome headache described as a head \"heaviness\" in the late afternoon. His optometrist is Dr. Martha Calloway, and he has seen them about six months ago. He has difficulty sleeping, both falling asleep and maintaining sleep. He relates it often takes him >40 min to fall asleep and typically does awake many times throughout the night sleeping for a total of 4-5 hours/night. He does have a consistent bedtime routine. The insomnia is relatively new, of the past two months. He does not report excessive daytime sleepiness. Melatonin is not helpful.      Past Medical History:     Past Medical History:   Diagnosis Date    Hyperlipidemia     Hypertension     Insomnia       Reviewed all health maintenance requirements and ordered appropriate tests  Health Maintenance Due   Topic Date Due    HIV screen  Never done    Hepatitis C screen  Never done    Colorectal Cancer Screen  Never done    Shingles vaccine (1 of 2) Never done    COVID-19 Vaccine (4 - Booster for Moderna series) 2022    Flu vaccine (1) 2022       Past Surgical History:     No past surgical history on file. Medications:       Prior to Admission medications    Medication Sig Start Date End Date Taking? Authorizing Provider   omeprazole (PRILOSEC) 40 MG delayed release capsule take 1 capsule by mouth twice a day 2/4/23  Yes Historical Provider, MD   doxazosin (CARDURA) 2 MG tablet Take 1 tablet by mouth daily 2/9/23  Yes Griselda Ahle, MD   metoprolol tartrate (LOPRESSOR) 25 MG tablet Take 1 tablet by mouth 2 times daily 2/8/23  Yes Griselda Ahle, MD   lisinopril-hydroCHLOROthiazide (PRINZIDE;ZESTORETIC) 20-12.5 MG per tablet Take 2 tablets by mouth daily 2/8/23  Yes Griselda Ahle, MD   atorvastatin (LIPITOR) 10 MG tablet Take 1 tablet by mouth daily 12/28/22  Yes Dov Manzanares DO   aspirin 81 MG EC tablet Take 81 mg by mouth daily   Yes Historical Provider, MD   cimetidine (TAGAMET) 300 MG tablet Take 300 mg by mouth 4 times daily   Yes Historical Provider, MD   melatonin 5 MG TABS tablet Take 5 mg by mouth nightly as needed   Yes Historical Provider, MD        Allergies:       Patient has no known allergies. Social History:     Tobacco:    reports that he has been smoking cigarettes. He has a 7.50 pack-year smoking history. He has never used smokeless tobacco.  Alcohol:      reports current alcohol use. Drug Use:  reports no history of drug use. Family History:     Family History   Problem Relation Age of Onset    High Cholesterol Mother     Diabetes Mother     High Blood Pressure Mother     High Cholesterol Father     High Blood Pressure Father     Heart Attack Father        Review of Systems:         Review of Systems   Constitutional:  Negative for fever. HENT:  Negative for sinus pain, sneezing and sore throat. Eyes:  Positive for redness and visual disturbance. Respiratory:  Negative for cough and wheezing. Cardiovascular:  Negative for chest pain. Gastrointestinal:  Negative for abdominal pain, diarrhea, nausea and vomiting.    Musculoskeletal:  Negative for back pain.   Skin:  Negative for rash. Neurological:  Positive for headaches. Hematological:  Negative for adenopathy. Psychiatric/Behavioral:  Positive for sleep disturbance. Physical Exam:     Vitals:  /80   Pulse 73   Wt 170 lb (77.1 kg)   SpO2 100%   BMI 27.44 kg/m²       Physical Exam  Vitals and nursing note reviewed. Constitutional:       General: He is not in acute distress. Appearance: Normal appearance. He is normal weight. Eyes:      General:         Right eye: No discharge. Left eye: No discharge. Conjunctiva/sclera: Conjunctivae normal.      Comments: Brief retinal exam with coaxial ophthalmoscope WNL   Cardiovascular:      Rate and Rhythm: Normal rate and regular rhythm. Pulses: Normal pulses. Heart sounds: Normal heart sounds. No murmur heard. Pulmonary:      Effort: Pulmonary effort is normal. No respiratory distress. Breath sounds: Normal breath sounds. No wheezing or rhonchi. Musculoskeletal:         General: No swelling or tenderness. Normal range of motion. Skin:     General: Skin is warm and dry. Capillary Refill: Capillary refill takes less than 2 seconds. Neurological:      General: No focal deficit present. Mental Status: He is alert and oriented to person, place, and time. Mental status is at baseline. Psychiatric:         Mood and Affect: Mood normal.         Behavior: Behavior normal.         Thought Content:  Thought content normal.               Data:     Lab Results   Component Value Date/Time     02/08/2023 05:45 AM    K 4.2 02/08/2023 05:45 AM    CL 95 02/08/2023 05:45 AM    CO2 26 02/08/2023 05:45 AM    BUN 5 02/08/2023 05:45 AM    CREATININE 0.68 02/08/2023 05:45 AM    GLUCOSE 101 02/08/2023 05:45 AM    PROT 7.1 02/07/2023 12:58 PM    LABALBU 4.5 02/07/2023 12:58 PM    BILITOT 0.6 02/07/2023 12:58 PM    ALKPHOS 72 02/07/2023 12:58 PM    AST 51 02/07/2023 12:58 PM    ALT 44 02/07/2023 12:58 PM     Lab Results   Component Value Date/Time    WBC 5.1 02/08/2023 05:45 AM    RBC 4.79 02/08/2023 05:45 AM    HGB 14.6 02/08/2023 05:45 AM    HCT 43.5 02/08/2023 05:45 AM    MCV 90.9 02/08/2023 05:45 AM    MCH 30.4 02/08/2023 05:45 AM    MCHC 33.5 02/08/2023 05:45 AM    RDW 14.2 02/08/2023 05:45 AM     02/08/2023 05:45 AM     Lab Results   Component Value Date/Time    TSH 0.92 02/07/2023 12:58 PM     Lab Results   Component Value Date/Time    CHOL 145 12/14/2022 09:26 AM    HDL 71 12/14/2022 09:26 AM    LABA1C 5.8 12/14/2022 09:26 AM          Assessment & Plan        Diagnosis Orders   1. Essential hypertension        2. Acute intractable headache, unspecified headache type        3. Insomnia, unspecified type            1.  Much better controlled, continue current therapy. 2.  His principal question is whether or not his headache could be a medication side effect. After reviewing his existing antihypertensive medications, I highly doubt this. We will continue to monitor. 3.  We will attempt treatment with hydroxyzine 50 mg nightly and follow-up in 4 weeks. The patient I had a long discussion about starting hydroxyzine. We discussed its mechanism of action, intended goals, adverse effects, as well as common side effects. They were able to verbalize understanding, and repeat plan back to me. Completed Refills   Requested Prescriptions      No prescriptions requested or ordered in this encounter     No follow-ups on file. No orders of the defined types were placed in this encounter. No orders of the defined types were placed in this encounter. There are no Patient Instructions on file for this visit.     Electronically signed by Ivett William DO on 2/15/2023 at 9:31 AM       Completed Refills   Requested Prescriptions      No prescriptions requested or ordered in this encounter

## 2023-02-21 ENCOUNTER — OFFICE VISIT (OUTPATIENT)
Dept: FAMILY MEDICINE CLINIC | Age: 53
End: 2023-02-21
Payer: MEDICAID

## 2023-02-21 VITALS
DIASTOLIC BLOOD PRESSURE: 70 MMHG | SYSTOLIC BLOOD PRESSURE: 132 MMHG | WEIGHT: 170 LBS | BODY MASS INDEX: 27.44 KG/M2 | OXYGEN SATURATION: 98 % | HEART RATE: 78 BPM

## 2023-02-21 DIAGNOSIS — M26.622 TMJ TENDERNESS, LEFT: ICD-10-CM

## 2023-02-21 DIAGNOSIS — H92.02 LEFT EAR PAIN: Primary | ICD-10-CM

## 2023-02-21 DIAGNOSIS — M99.00 SOMATIC DYSFUNCTION OF HEAD REGION: ICD-10-CM

## 2023-02-21 DIAGNOSIS — R42 DIZZINESS: ICD-10-CM

## 2023-02-21 DIAGNOSIS — H93.8X2 EAR FULLNESS, LEFT: ICD-10-CM

## 2023-02-21 PROCEDURE — G8427 DOCREV CUR MEDS BY ELIG CLIN: HCPCS | Performed by: STUDENT IN AN ORGANIZED HEALTH CARE EDUCATION/TRAINING PROGRAM

## 2023-02-21 PROCEDURE — G8484 FLU IMMUNIZE NO ADMIN: HCPCS | Performed by: STUDENT IN AN ORGANIZED HEALTH CARE EDUCATION/TRAINING PROGRAM

## 2023-02-21 PROCEDURE — 4004F PT TOBACCO SCREEN RCVD TLK: CPT | Performed by: STUDENT IN AN ORGANIZED HEALTH CARE EDUCATION/TRAINING PROGRAM

## 2023-02-21 PROCEDURE — 1111F DSCHRG MED/CURRENT MED MERGE: CPT | Performed by: STUDENT IN AN ORGANIZED HEALTH CARE EDUCATION/TRAINING PROGRAM

## 2023-02-21 PROCEDURE — 3017F COLORECTAL CA SCREEN DOC REV: CPT | Performed by: STUDENT IN AN ORGANIZED HEALTH CARE EDUCATION/TRAINING PROGRAM

## 2023-02-21 PROCEDURE — 99213 OFFICE O/P EST LOW 20 MIN: CPT | Performed by: STUDENT IN AN ORGANIZED HEALTH CARE EDUCATION/TRAINING PROGRAM

## 2023-02-21 PROCEDURE — 3078F DIAST BP <80 MM HG: CPT | Performed by: STUDENT IN AN ORGANIZED HEALTH CARE EDUCATION/TRAINING PROGRAM

## 2023-02-21 PROCEDURE — 3075F SYST BP GE 130 - 139MM HG: CPT | Performed by: STUDENT IN AN ORGANIZED HEALTH CARE EDUCATION/TRAINING PROGRAM

## 2023-02-21 PROCEDURE — G8419 CALC BMI OUT NRM PARAM NOF/U: HCPCS | Performed by: STUDENT IN AN ORGANIZED HEALTH CARE EDUCATION/TRAINING PROGRAM

## 2023-02-21 RX ORDER — PREDNISONE 20 MG/1
20 TABLET ORAL DAILY
Qty: 3 TABLET | Refills: 0 | Status: SHIPPED | OUTPATIENT
Start: 2023-02-21 | End: 2023-02-24

## 2023-02-21 ASSESSMENT — ENCOUNTER SYMPTOMS
COUGH: 0
VOMITING: 0
BACK PAIN: 0
DIARRHEA: 0
NAUSEA: 0
SORE THROAT: 0
ABDOMINAL PAIN: 0
WHEEZING: 0
SINUS PAIN: 0

## 2023-02-21 NOTE — PATIENT INSTRUCTIONS
Mr. Melonie Estrada,    Thank you for coming to see me today! I believe that our main problem is jaw joint dysfunction . Here's what I would recommend we do: Take prednisone one tablet a day for 3 days    We also discussed returning in 1-2 days so that I may treat you with osteopathic manipulative therapy (OMT) to improve the jaw muscles and correct the underlying problem. Please review the documents I'm attaching related to what we discussed today. Please give me a call or message me on [employer] if you have any problems or questions, and I will see you at your next visit.     Dr. Aleksandra Daniel

## 2023-02-21 NOTE — PROGRESS NOTES
HPI Notes    Name: Rock Fulton  : 1970         Chief Complaint:     Chief Complaint   Patient presents with    Otalgia     Patient finished antibiotic for ear infection but still experiencing a lot of pain with dizziness       History of Present Illness:      HPI    This is a 67-year-old man presenting for reevaluation of very bothersome left ear pain, fullness, dizziness. He had recently finished amoxicillin for this problem, without appreciable improvement. He still reports notable discomfort when lying down, as well as dizziness with lying on that side, changing position. This is also exacerbated (mildly) by opening/closing the jaw, but not with chewing. Past Medical History:     Past Medical History:   Diagnosis Date    Hyperlipidemia     Hypertension     Insomnia       Reviewed all health maintenance requirements and ordered appropriate tests  Health Maintenance Due   Topic Date Due    HIV screen  Never done    Hepatitis C screen  Never done    Colorectal Cancer Screen  Never done    Shingles vaccine (1 of 2) Never done    COVID-19 Vaccine (4 - Booster for Moderna series) 2022    Flu vaccine (1) 2022       Past Surgical History:     No past surgical history on file. Medications:       Prior to Admission medications    Medication Sig Start Date End Date Taking?  Authorizing Provider   predniSONE (DELTASONE) 20 MG tablet Take 1 tablet by mouth daily for 3 days 23 Yes Forrest German DO   hydrOXYzine HCl (ATARAX) 50 MG tablet Take 1 tablet by mouth nightly 2/15/23 3/17/23 Yes Forrest German DO   omeprazole (PRILOSEC) 40 MG delayed release capsule take 1 capsule by mouth twice a day 23  Yes Historical Provider, MD   doxazosin (CARDURA) 2 MG tablet Take 1 tablet by mouth daily 23  Yes Mahsa Bray MD   metoprolol tartrate (LOPRESSOR) 25 MG tablet Take 1 tablet by mouth 2 times daily 23  Yes Mahsa Bray MD   lisinopril-hydroCHLOROthiazide (PRINZIDE;ZESTORETIC) 20-12.5 MG per tablet Take 2 tablets by mouth daily 2/8/23  Yes Paul Nazario MD   atorvastatin (LIPITOR) 10 MG tablet Take 1 tablet by mouth daily 12/28/22  Yes Jamar Rowe DO   aspirin 81 MG EC tablet Take 81 mg by mouth daily   Yes Historical Provider, MD   cimetidine (TAGAMET) 300 MG tablet Take 300 mg by mouth 4 times daily   Yes Historical Provider, MD        Allergies:       Patient has no known allergies. Social History:     Tobacco:    reports that he has been smoking cigarettes. He has a 7.50 pack-year smoking history. He has never used smokeless tobacco.  Alcohol:      reports current alcohol use. Drug Use:  reports no history of drug use. Family History:     Family History   Problem Relation Age of Onset    High Cholesterol Mother     Diabetes Mother     High Blood Pressure Mother     High Cholesterol Father     High Blood Pressure Father     Heart Attack Father        Review of Systems:       Review of Systems   Constitutional:  Negative for fever. HENT:  Positive for ear pain and hearing loss. Negative for ear discharge, sinus pain, sneezing and sore throat. Respiratory:  Negative for cough and wheezing. Cardiovascular:  Negative for chest pain. Gastrointestinal:  Negative for abdominal pain, diarrhea, nausea and vomiting. Musculoskeletal:  Negative for back pain. Jaw pain   Skin:  Negative for rash. Hematological:  Negative for adenopathy. Psychiatric/Behavioral:  Negative for sleep disturbance. Physical Exam:     Vitals:  /70   Pulse 78   Wt 170 lb (77.1 kg)   SpO2 98%   BMI 27.44 kg/m²     Physical Exam  Vitals and nursing note reviewed. Constitutional:       General: He is not in acute distress. Appearance: Normal appearance. HENT:      Right Ear: Tympanic membrane, ear canal and external ear normal. There is no impacted cerumen.       Left Ear: Tympanic membrane and external ear normal. There is no impacted cerumen. Ears:      Comments: Small abrasion of the left ear canal     Nose: Nose normal. No congestion. Mouth/Throat:      Mouth: Mucous membranes are moist.      Pharynx: Oropharynx is clear. No oropharyngeal exudate. Musculoskeletal:         General: Tenderness present. No swelling. Normal range of motion. Comments: Tenderness over left TMJ with direct palpation, none with deviation of the jaw   Skin:     General: Skin is warm and dry. Capillary Refill: Capillary refill takes less than 2 seconds. Neurological:      General: No focal deficit present. Mental Status: He is alert and oriented to person, place, and time. Mental status is at baseline. Psychiatric:         Mood and Affect: Mood normal.         Behavior: Behavior normal.         Thought Content: Thought content normal.     Osteopathic: left TMJ dysfunction    Data:     Lab Results   Component Value Date/Time     02/08/2023 05:45 AM    K 4.2 02/08/2023 05:45 AM    CL 95 02/08/2023 05:45 AM    CO2 26 02/08/2023 05:45 AM    BUN 5 02/08/2023 05:45 AM    CREATININE 0.68 02/08/2023 05:45 AM    GLUCOSE 101 02/08/2023 05:45 AM    PROT 7.1 02/07/2023 12:58 PM    LABALBU 4.5 02/07/2023 12:58 PM    BILITOT 0.6 02/07/2023 12:58 PM    ALKPHOS 72 02/07/2023 12:58 PM    AST 51 02/07/2023 12:58 PM    ALT 44 02/07/2023 12:58 PM     Lab Results   Component Value Date/Time    WBC 5.1 02/08/2023 05:45 AM    RBC 4.79 02/08/2023 05:45 AM    HGB 14.6 02/08/2023 05:45 AM    HCT 43.5 02/08/2023 05:45 AM    MCV 90.9 02/08/2023 05:45 AM    MCH 30.4 02/08/2023 05:45 AM    MCHC 33.5 02/08/2023 05:45 AM    RDW 14.2 02/08/2023 05:45 AM     02/08/2023 05:45 AM     Lab Results   Component Value Date/Time    TSH 0.92 02/07/2023 12:58 PM     Lab Results   Component Value Date/Time    CHOL 145 12/14/2022 09:26 AM    HDL 71 12/14/2022 09:26 AM    LABA1C 5.8 12/14/2022 09:26 AM          Assessment & Plan        Diagnosis Orders   1.  Left ear pain predniSONE (DELTASONE) 20 MG tablet      2. Ear fullness, left  predniSONE (DELTASONE) 20 MG tablet      3. Dizziness  predniSONE (DELTASONE) 20 MG tablet      4. TMJ tenderness, left  predniSONE (DELTASONE) 20 MG tablet      5. Somatic dysfunction of head region  predniSONE (DELTASONE) 20 MG tablet          Differential now includes TMJ, will treat with prednisone 20 mg daily for 3 days, then reevaluate for treatment with OMT. Completed Refills   Requested Prescriptions     Signed Prescriptions Disp Refills    predniSONE (DELTASONE) 20 MG tablet 3 tablet 0     Sig: Take 1 tablet by mouth daily for 3 days     Return in about 2 days (around 2/23/2023) for OMT. Orders Placed This Encounter   Medications    predniSONE (DELTASONE) 20 MG tablet     Sig: Take 1 tablet by mouth daily for 3 days     Dispense:  3 tablet     Refill:  0     No orders of the defined types were placed in this encounter. Patient Instructions   Mr. Daniel Mcintyre,    Thank you for coming to see me today! I believe that our main problem is jaw joint dysfunction . Here's what I would recommend we do: Take prednisone one tablet a day for 3 days    We also discussed returning in 1-2 days so that I may treat you with osteopathic manipulative therapy (OMT) to improve the jaw muscles and correct the underlying problem. Please review the documents I'm attaching related to what we discussed today. Please give me a call or message me on [employer] if you have any problems or questions, and I will see you at your next visit.     Dr. Pamela Flores    Electronically signed by Stefano Rowe DO on 2/21/2023 at 10:10 AM           Completed Refills   Requested Prescriptions     Signed Prescriptions Disp Refills    predniSONE (DELTASONE) 20 MG tablet 3 tablet 0     Sig: Take 1 tablet by mouth daily for 3 days

## 2023-02-23 ENCOUNTER — OFFICE VISIT (OUTPATIENT)
Dept: FAMILY MEDICINE CLINIC | Age: 53
End: 2023-02-23

## 2023-02-23 VITALS
HEART RATE: 69 BPM | BODY MASS INDEX: 27.44 KG/M2 | OXYGEN SATURATION: 97 % | DIASTOLIC BLOOD PRESSURE: 76 MMHG | SYSTOLIC BLOOD PRESSURE: 120 MMHG | WEIGHT: 170 LBS

## 2023-02-23 DIAGNOSIS — H92.02 LEFT EAR PAIN: Primary | ICD-10-CM

## 2023-02-23 DIAGNOSIS — M26.622 TMJ TENDERNESS, LEFT: ICD-10-CM

## 2023-02-23 DIAGNOSIS — M26.609 TEMPOROMANDIBULAR JOINT DYSFUNCTION SYNDROME: ICD-10-CM

## 2023-02-23 DIAGNOSIS — M99.00 HEAD REGION SOMATIC DYSFUNCTION: ICD-10-CM

## 2023-02-23 NOTE — PROGRESS NOTES
Interval history: this is a 47year old man presenting for treatment with OMT for suspected TMJ causing left ear, jaw pain. Please see my previous OV note for a ferrer description of this problem. Of note, he feels minimally improved with the short burst of prednisone I had given him at his last OV. /76   Pulse 69   Wt 170 lb (77.1 kg)   SpO2 97%   BMI 27.44 kg/m²     Procedure Note: After history taking and examination of patient, it was determined that a beneficial treatment modality for their complaint would be osteopathic manipulative therapy (OMT) the nature of OMT, treatment goals, mechanism of action, and side effects were extensively discussed with this patient, who did wish to proceed with the procedure. The following body systems were treated with osteopathy during our office visit today: head region. Osteopathic findings include left TMJ with palpable crepitus, deviation toward left side with jaw opening, hypertonicity of right masseter, left cranial torsion. After treatment, the patient was reevaluated and the following physical exam findings were appreciated: resolution of all s/d except right masseter hypertonicity. We determined that the next best time for the patient to return to the office for additional treatment would be within one week, preferably on Monday. At this point, her treatment was concluded, there were no complications, there were no further questions. Patient tolerated this procedure very well.     Markel Yates DO  2/23/2023  9:02 AM

## 2023-02-27 ENCOUNTER — OFFICE VISIT (OUTPATIENT)
Dept: FAMILY MEDICINE CLINIC | Age: 53
End: 2023-02-27
Payer: MEDICAID

## 2023-02-27 VITALS
WEIGHT: 172 LBS | BODY MASS INDEX: 27.76 KG/M2 | HEART RATE: 64 BPM | DIASTOLIC BLOOD PRESSURE: 72 MMHG | SYSTOLIC BLOOD PRESSURE: 118 MMHG | OXYGEN SATURATION: 99 %

## 2023-02-27 DIAGNOSIS — H92.02 LEFT EAR PAIN: Primary | ICD-10-CM

## 2023-02-27 DIAGNOSIS — M26.622 TMJ TENDERNESS, LEFT: ICD-10-CM

## 2023-02-27 DIAGNOSIS — M99.00 HEAD REGION SOMATIC DYSFUNCTION: ICD-10-CM

## 2023-02-27 DIAGNOSIS — M26.609 TEMPOROMANDIBULAR JOINT DYSFUNCTION SYNDROME: ICD-10-CM

## 2023-02-27 PROCEDURE — 98925 OSTEOPATH MANJ 1-2 REGIONS: CPT | Performed by: STUDENT IN AN ORGANIZED HEALTH CARE EDUCATION/TRAINING PROGRAM

## 2023-02-27 NOTE — PROGRESS NOTES
History: This is a 71-year-old man for whom I made a provisional diagnosis of temporomandibular joint dysfunction syndrome at our next last office visit. I treated him with a short course of prednisone and have performed OMT for him 1 time. He reports that he is improved somewhat since prior examination. /72   Pulse 64   Wt 172 lb (78 kg)   SpO2 99%   BMI 27.76 kg/m²     Procedure Note: After history taking and examination of patient, it was determined that a beneficial treatment modality for their complaint would be osteopathic manipulative therapy (OMT) the nature of OMT, treatment goals, mechanism of action, and side effects were extensively discussed with this patient, who did wish to proceed with the procedure. The following body systems were treated with osteopathy during our office visit today: Head. Osteopathic findings include left cranial torsion, jaw deviates to left on opening, hypertonic left masseter and lateral pterygoid mm. After treatment, the patient was reevaluated and the following physical exam findings were appreciated: resolution of somatic dysfunction. We determined that the next best time for the patient to return to the office for additional treatment would be in 1 week as needed. At this point, her treatment was concluded, there were no complications, there were no further questions. Patient tolerated this procedure very well.     Radha Turner DO  2/27/2023  8:28 AM

## 2023-03-12 DIAGNOSIS — G47.00 INSOMNIA, UNSPECIFIED TYPE: ICD-10-CM

## 2023-03-13 RX ORDER — HYDROXYZINE 50 MG/1
50 TABLET, FILM COATED ORAL NIGHTLY
Qty: 30 TABLET | Refills: 0 | Status: SHIPPED | OUTPATIENT
Start: 2023-03-13 | End: 2023-04-12

## 2023-03-13 RX ORDER — CIMETIDINE 300 MG/1
300 TABLET, FILM COATED ORAL 4 TIMES DAILY
Qty: 120 TABLET | Refills: 5 | Status: SHIPPED | OUTPATIENT
Start: 2023-03-13

## 2023-03-13 NOTE — TELEPHONE ENCOUNTER
Last OV: 2/27/2023  Last RX:    Next scheduled apt: Visit date not found          Pt requesting a refill

## 2023-03-13 NOTE — TELEPHONE ENCOUNTER
Patient is asking for a refill on Cimetidine 300 mg. Patient last seen 2/27/23. No future appt found.     [facility] Maintenance   Topic Date Due    HIV screen  Never done    Hepatitis C screen  Never done    Colorectal Cancer Screen  Never done    Shingles vaccine (1 of 2) Never done    COVID-19 Vaccine (4 - Booster for Moderna series) 01/25/2022    Flu vaccine (1) 08/01/2022    A1C test (Diabetic or Prediabetic)  12/14/2023    Lipids  12/14/2023    Depression Screen  02/07/2024    DTaP/Tdap/Td vaccine (2 - Td or Tdap) 07/24/2025    Pneumococcal 0-64 years Vaccine  Completed    Hepatitis A vaccine  Aged Out    Hib vaccine  Aged Out    Meningococcal (ACWY) vaccine  Aged Out             (applicable per patient's age: Cancer Screenings, Depression Screening, Fall Risk Screening, Immunizations)    Hemoglobin A1C (%)   Date Value   12/14/2022 5.8     LDL Cholesterol (mg/dL)   Date Value   12/14/2022 64     AST (U/L)   Date Value   02/07/2023 51 (H)     ALT (U/L)   Date Value   02/07/2023 44 (H)     BUN (mg/dL)   Date Value   02/08/2023 5 (L)      (goal A1C is < 7)   (goal LDL is <100) need 30-50% reduction from baseline     BP Readings from Last 3 Encounters:   02/27/23 118/72   02/23/23 120/76   02/21/23 132/70    (goal /80)      All Future Testing planned in [facility]:  Lab Frequency Next Occurrence   NM MYOCARDIAL SPECT REST EXERCISE OR RX Once 02/09/2023       Next Visit Date:  Future Appointments   Date Time Provider Lars Pierce   4/3/2023  9:00 AM MD Bhargavi Mendoza [facility]            Patient Active Problem List:     Essential hypertension     Mixed hyperlipidemia     Primary insomnia     Bilateral lower extremity edema     Elevated LFTs     Hypertensive urgency

## 2023-03-13 NOTE — TELEPHONE ENCOUNTER
Last OV: 2/27/2023  02/15/23 chronic   Last RX:    Next scheduled apt:         Surescript requesting a refill

## 2023-05-04 ENCOUNTER — OFFICE VISIT (OUTPATIENT)
Dept: CARDIOLOGY CLINIC | Age: 53
End: 2023-05-04

## 2023-05-04 VITALS
WEIGHT: 172 LBS | BODY MASS INDEX: 27.76 KG/M2 | SYSTOLIC BLOOD PRESSURE: 140 MMHG | HEART RATE: 76 BPM | OXYGEN SATURATION: 98 % | DIASTOLIC BLOOD PRESSURE: 80 MMHG

## 2023-05-04 DIAGNOSIS — I10 ESSENTIAL HYPERTENSION: Primary | ICD-10-CM

## 2023-05-04 DIAGNOSIS — I16.0 HYPERTENSIVE URGENCY: ICD-10-CM

## 2023-05-04 DIAGNOSIS — E78.2 MIXED HYPERLIPIDEMIA: ICD-10-CM

## 2023-05-04 DIAGNOSIS — F51.01 PRIMARY INSOMNIA: ICD-10-CM

## 2023-05-04 RX ORDER — LISINOPRIL AND HYDROCHLOROTHIAZIDE 20; 12.5 MG/1; MG/1
1 TABLET ORAL 2 TIMES DAILY
COMMUNITY

## 2023-05-04 RX ORDER — ATORVASTATIN CALCIUM 10 MG/1
10 TABLET, FILM COATED ORAL NIGHTLY
COMMUNITY

## 2023-05-04 NOTE — PROGRESS NOTES
Ov DR Jered Hinson consult   In patient 2/7 Htn urgency  Seen per DR Jered Hinson  Bp at home stable now. C/o pinching in lt chest   In the afternoon  3-4 times a week last   3-4 hrs. Taking all meds in the AM  Including 2 of the meds that   Are bid he takes 2 tabs in am.  Daughter 12 son 6 . Was a . Did not do when he came to   [facility]. Only child. To take lisinopril/hctz and metoprol   Twice a day     Call in 2 weeks with bp.

## 2023-05-07 DIAGNOSIS — G47.00 INSOMNIA, UNSPECIFIED TYPE: ICD-10-CM

## 2023-05-08 RX ORDER — HYDROXYZINE 50 MG/1
TABLET, FILM COATED ORAL
Qty: 30 TABLET | Refills: 0 | Status: SHIPPED | OUTPATIENT
Start: 2023-05-08

## 2023-05-22 ENCOUNTER — TELEPHONE (OUTPATIENT)
Dept: CARDIOLOGY CLINIC | Age: 53
End: 2023-05-22

## 2023-05-22 NOTE — TELEPHONE ENCOUNTER
Caitlyn Natarajan left a message to state that the BP medication change is working. He stated that he has been taking his BP and the morning and afternoon it is workiing staying at or around 120/80. He stated that sometimes a night it does go up to 150/80, but he is happy that the medication change is working.

## 2023-05-30 RX ORDER — DOXAZOSIN 2 MG/1
TABLET ORAL
Qty: 30 TABLET | Refills: 3 | OUTPATIENT
Start: 2023-05-30

## 2023-06-04 DIAGNOSIS — G47.00 INSOMNIA, UNSPECIFIED TYPE: ICD-10-CM

## 2023-06-05 RX ORDER — HYDROXYZINE 50 MG/1
TABLET, FILM COATED ORAL
Qty: 30 TABLET | Refills: 0 | Status: SHIPPED | OUTPATIENT
Start: 2023-06-05

## 2023-06-28 DIAGNOSIS — G47.00 INSOMNIA, UNSPECIFIED TYPE: ICD-10-CM

## 2023-06-28 RX ORDER — FUROSEMIDE 20 MG/1
20 TABLET ORAL DAILY
Qty: 90 TABLET | Refills: 1 | Status: SHIPPED | OUTPATIENT
Start: 2023-06-28

## 2023-06-28 RX ORDER — HYDROXYZINE 50 MG/1
50 TABLET, FILM COATED ORAL NIGHTLY
Qty: 30 TABLET | Refills: 0 | Status: SHIPPED | OUTPATIENT
Start: 2023-06-28

## 2023-07-03 ENCOUNTER — NURSE ONLY (OUTPATIENT)
Dept: FAMILY MEDICINE CLINIC | Age: 53
End: 2023-07-03
Payer: MEDICAID

## 2023-07-03 VITALS — DIASTOLIC BLOOD PRESSURE: 76 MMHG | HEART RATE: 76 BPM | OXYGEN SATURATION: 99 % | SYSTOLIC BLOOD PRESSURE: 130 MMHG

## 2023-07-03 DIAGNOSIS — Z01.30 BLOOD PRESSURE CHECK: Primary | ICD-10-CM

## 2023-07-03 PROCEDURE — 96372 THER/PROPH/DIAG INJ SC/IM: CPT | Performed by: STUDENT IN AN ORGANIZED HEALTH CARE EDUCATION/TRAINING PROGRAM

## 2023-07-28 ENCOUNTER — OFFICE VISIT (OUTPATIENT)
Dept: FAMILY MEDICINE CLINIC | Age: 53
End: 2023-07-28
Payer: MEDICAID

## 2023-07-28 VITALS
BODY MASS INDEX: 26.95 KG/M2 | WEIGHT: 167 LBS | DIASTOLIC BLOOD PRESSURE: 70 MMHG | HEART RATE: 65 BPM | SYSTOLIC BLOOD PRESSURE: 100 MMHG | OXYGEN SATURATION: 97 %
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
## 2023-07-28 DIAGNOSIS — R07.81 RIB PAIN ON LEFT SIDE: ICD-10-CM
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
## 2023-07-28 DIAGNOSIS — S61.412A LACERATION OF LEFT HAND WITHOUT FOREIGN BODY, INITIAL ENCOUNTER: ICD-10-CM

## 2023-07-28 PROCEDURE — G8419 CALC BMI OUT NRM PARAM NOF/U: HCPCS | Performed by: STUDENT IN AN ORGANIZED HEALTH CARE EDUCATION/TRAINING PROGRAM

## 2023-07-28 PROCEDURE — 3074F SYST BP LT 130 MM HG: CPT | Performed by: STUDENT IN AN ORGANIZED HEALTH CARE EDUCATION/TRAINING PROGRAM

## 2023-07-28 PROCEDURE — 3078F DIAST BP <80 MM HG: CPT | Performed by: STUDENT IN AN ORGANIZED HEALTH CARE EDUCATION/TRAINING PROGRAM

## 2023-07-28 PROCEDURE — G8427 DOCREV CUR MEDS BY ELIG CLIN: HCPCS | Performed by: STUDENT IN AN ORGANIZED HEALTH CARE EDUCATION/TRAINING PROGRAM

## 2023-07-28 PROCEDURE — 99214 OFFICE O/P EST MOD 30 MIN: CPT | Performed by: STUDENT IN AN ORGANIZED HEALTH CARE EDUCATION/TRAINING PROGRAM

## 2023-07-28 PROCEDURE — 13132 CMPLX RPR F/C/C/M/N/AX/G/H/F: CPT | Performed by: STUDENT IN AN ORGANIZED HEALTH CARE EDUCATION/TRAINING PROGRAM

## 2023-07-28 PROCEDURE — 3017F COLORECTAL CA SCREEN DOC REV: CPT | Performed by: STUDENT IN AN ORGANIZED HEALTH CARE EDUCATION/TRAINING PROGRAM

## 2023-07-28 PROCEDURE — 4004F PT TOBACCO SCREEN RCVD TLK: CPT | Performed by: STUDENT IN AN ORGANIZED HEALTH CARE EDUCATION/TRAINING PROGRAM

## 2023-07-28 RX ORDER — CETIRIZINE HYDROCHLORIDE 10 MG/1
10 TABLET ORAL DAILY
COMMUNITY
Start: 2023-07-03

## 2023-07-28 ASSESSMENT — ENCOUNTER SYMPTOMS
DIARRHEA: 0
BACK PAIN: 0
VOMITING: 0
ABDOMINAL PAIN: 0
SINUS PAIN: 0
WHEEZING: 0
SORE THROAT: 0
COUGH: 0
NAUSEA: 0

## 2023-07-28 NOTE — PATIENT INSTRUCTIONS
SURVEY:    You may be receiving a survey from [employer] regarding your visit today. You may get this in the mail, through your MyChart or in your email. Please complete the survey to enable us to provide the highest quality of care to you and your family. If you cannot score us as very good ( 5 Stars) on any question, please feel free to call the office to discuss how we could have made your experience exceptional.     Thank you.     Clinical Care Team:  DO Marianne Zelaya LPN    Triage:  Satish Cm, [address] [facility] Team:  Heather Cordova

## 2023-07-28 NOTE — PROGRESS NOTES
11:10 AM           Completed Refills   Requested Prescriptions     Signed Prescriptions Disp Refills    traMADol-acetaminophen (ULTRACET) 37.5-325 MG per tablet 56 tablet 0     Sig: Take 1 tablet by mouth every 6 hours as needed for Pain for up to 14 days.  Max Daily Amount: 4 tablets

## 2023-07-30 DIAGNOSIS — G47.00 INSOMNIA, UNSPECIFIED TYPE: ICD-10-CM

## 2023-07-31 RX ORDER — HYDROXYZINE 50 MG/1
50 TABLET, FILM COATED ORAL NIGHTLY
Qty: 30 TABLET | Refills: 0 | Status: SHIPPED | OUTPATIENT
Start: 2023-07-31

## 2023-07-31 NOTE — TELEPHONE ENCOUNTER
Last OV: 7/28/2023    Next scheduled apt: 8/7/2023      Surescripts requesting refill  Medication pending

## 2023-08-01 NOTE — TELEPHONE ENCOUNTER
Tagamet 300 mg  Lipitor 10 mg    [facility]    Health Maintenance   Topic Date Due    HIV screen  Never done    Hepatitis C screen  Never done    Colorectal Cancer Screen  Never done    Shingles vaccine (1 of 2) Never done    COVID-19 Vaccine (4 - Booster for Moderna series) 01/25/2022    Flu vaccine (1) 08/01/2023    A1C test (Diabetic or Prediabetic)  12/14/2023    Lipids  12/14/2023    Depression Screen  02/07/2024    DTaP/Tdap/Td vaccine (2 - Td or Tdap) 07/24/2025    Pneumococcal 0-64 years Vaccine  Completed    Hepatitis A vaccine  Aged Out    Hib vaccine  Aged Out    Meningococcal (ACWY) vaccine  Aged Out    Diabetes screen  Discontinued             (applicable per patient's age: Cancer Screenings, Depression Screening, Fall Risk Screening, Immunizations)    Hemoglobin A1C (%)   Date Value   12/14/2022 5.8     LDL Cholesterol (mg/dL)   Date Value   12/14/2022 64     AST (U/L)   Date Value   02/07/2023 51 (H)     ALT (U/L)   Date Value   02/07/2023 44 (H)     BUN (mg/dL)   Date Value   02/08/2023 5 (L)      (goal A1C is < 7)   (goal LDL is <100) need 30-50% reduction from baseline     BP Readings from Last 3 Encounters:   07/28/23 100/70   07/03/23 130/76   05/04/23 (!) 140/80    (goal /80)      All Future Testing planned in [facility]:  Lab Frequency Next Occurrence   NM MYOCARDIAL SPECT REST EXERCISE OR RX Once 02/09/2023   TSH with Reflex Once 05/04/2024   Comprehensive Metabolic Panel Once 58/53/3235   CBC with Auto Differential Once 05/04/2024   Lipid Panel Once 05/04/2024   Magnesium Once 05/04/2024   Vitamin D 25 Hydroxy Once 05/04/2024   EKG 12 Lead Once 05/04/2024   XR CHEST (2 VW) Once 05/04/2024       Next Visit Date:  Future Appointments   Date Time Provider [address]   8/7/2023  8:40 AM Janae Dc DO [place] [facility]   4/29/2024  9:00 AM MD Jordan López [facility]            Patient Active Problem List:     Essential hypertension     Mixed hyperlipidemia     Primary

## 2023-08-01 NOTE — TELEPHONE ENCOUNTER
Last OV: 7/28/2023 05/04/23 NP   Last RX:    Next scheduled apt: 8/7/2023 suture removal             Surescript requesting a refill

## 2023-08-02 RX ORDER — ATORVASTATIN CALCIUM 10 MG/1
10 TABLET, FILM COATED ORAL NIGHTLY
Qty: 90 TABLET | Refills: 1 | Status: SHIPPED | OUTPATIENT
Start: 2023-08-02

## 2023-08-25 RX ORDER — CIMETIDINE 300 MG/1
TABLET, FILM COATED ORAL
Qty: 120 TABLET | Refills: 5 | Status: SHIPPED | OUTPATIENT
Start: 2023-08-25

## 2023-08-25 NOTE — TELEPHONE ENCOUNTER
Last OV: 7/28/2023  02/15/23 chronic   Last RX:    Next scheduled apt: Visit date not found            Surescript requesting a refill       Medication was D/C on 05/04/23

## 2023-08-28 DIAGNOSIS — G47.00 INSOMNIA, UNSPECIFIED TYPE: ICD-10-CM

## 2023-08-28 RX ORDER — HYDROXYZINE 50 MG/1
50 TABLET, FILM COATED ORAL NIGHTLY
Qty: 30 TABLET | Refills: 0 | Status: SHIPPED | OUTPATIENT
Start: 2023-08-28

## 2023-08-28 NOTE — TELEPHONE ENCOUNTER
Last OV: 7/28/2023   HFU chronic   Last RX:    Next scheduled apt: Visit date not found            Surescript requesting a refill

## 2023-09-24 DIAGNOSIS — G47.00 INSOMNIA, UNSPECIFIED TYPE: ICD-10-CM

## 2023-09-25 RX ORDER — HYDROXYZINE 50 MG/1
50 TABLET, FILM COATED ORAL
Qty: 30 TABLET | Refills: 0 | Status: SHIPPED | OUTPATIENT
Start: 2023-09-25

## 2023-09-29 RX ORDER — DOXAZOSIN 2 MG/1
2 TABLET ORAL DAILY
Qty: 30 TABLET | Refills: 3 | Status: SHIPPED | OUTPATIENT
Start: 2023-09-29

## 2023-09-29 NOTE — TELEPHONE ENCOUNTER
Last OV: 7/28/2023 02/27/23 chronic   Last RX:    Next scheduled apt: Visit date not found            Surescript requesting a refill

## 2023-10-21 DIAGNOSIS — G47.00 INSOMNIA, UNSPECIFIED TYPE: ICD-10-CM

## 2023-10-23 RX ORDER — HYDROXYZINE 50 MG/1
50 TABLET, FILM COATED ORAL
Qty: 30 TABLET | Refills: 0 | Status: SHIPPED | OUTPATIENT
Start: 2023-10-23

## 2023-10-23 NOTE — TELEPHONE ENCOUNTER
Last OV: 7/28/2023   02/15/23 HFU   Last RX:    Next scheduled apt: Visit date not found            Surescript requesting a refill

## 2023-11-18 DIAGNOSIS — G47.00 INSOMNIA, UNSPECIFIED TYPE: ICD-10-CM

## 2023-11-20 RX ORDER — HYDROXYZINE 50 MG/1
50 TABLET, FILM COATED ORAL
Qty: 30 TABLET | Refills: 5 | Status: SHIPPED | OUTPATIENT
Start: 2023-11-20

## 2023-11-20 NOTE — TELEPHONE ENCOUNTER
Last OV: 7/28/2023    Next scheduled apt: Visit date not found      Surescripts requesting refill  Medication pending

## 2023-12-14 NOTE — TELEPHONE ENCOUNTER
Last OV: 7/28/2023   02/15/23 hospital fallow up   Last RX:    Next scheduled apt: Visit date not found            Surescript requesting a refill

## 2024-01-10 RX ORDER — LISINOPRIL AND HYDROCHLOROTHIAZIDE 20; 12.5 MG/1; MG/1
2 TABLET ORAL DAILY
Qty: 60 TABLET | OUTPATIENT
Start: 2024-01-10

## 2024-01-11 RX ORDER — ATORVASTATIN CALCIUM 10 MG/1
10 TABLET, FILM COATED ORAL NIGHTLY
Qty: 90 TABLET | Refills: 1 | Status: SHIPPED | OUTPATIENT
Start: 2024-01-11

## 2024-01-11 RX ORDER — LISINOPRIL AND HYDROCHLOROTHIAZIDE 20; 12.5 MG/1; MG/1
1 TABLET ORAL
Qty: 30 TABLET | Refills: 0 | Status: SHIPPED | OUTPATIENT
Start: 2024-01-11

## 2024-01-11 RX ORDER — DOXAZOSIN 2 MG/1
2 TABLET ORAL DAILY
Qty: 30 TABLET | Refills: 3 | Status: SHIPPED | OUTPATIENT
Start: 2024-01-11

## 2024-01-11 NOTE — TELEPHONE ENCOUNTER
Lisinopril-hctz 20-12.5 mg  Atorvastatin 10 mg  Doxazosin 2 mg    Pat    Health Maintenance   Topic Date Due    Hepatitis B vaccine (1 of 3 - 3-dose series) Never done    HIV screen  Never done    Hepatitis C screen  Never done    Colorectal Cancer Screen  Never done    Pneumococcal 0-64 years Vaccine (2 - PCV) 10/19/2017    Shingles vaccine (1 of 2) Never done    Flu vaccine (1) 08/01/2023    COVID-19 Vaccine (4 - 2023-24 season) 09/01/2023    A1C test (Diabetic or Prediabetic)  12/14/2023    Lipids  12/14/2023    Depression Screen  02/07/2024    DTaP/Tdap/Td vaccine (2 - Td or Tdap) 07/24/2025    Hepatitis A vaccine  Aged Out    Hib vaccine  Aged Out    Polio vaccine  Aged Out    Meningococcal (ACWY) vaccine  Aged Out    Diabetes screen  Discontinued             (applicable per patient's age: Cancer Screenings, Depression Screening, Fall Risk Screening, Immunizations)    Hemoglobin A1C (%)   Date Value   12/14/2022 5.8     LDL Cholesterol (mg/dL)   Date Value   12/14/2022 64     AST (U/L)   Date Value   02/07/2023 51 (H)     ALT (U/L)   Date Value   02/07/2023 44 (H)     BUN (mg/dL)   Date Value   02/08/2023 5 (L)      (goal A1C is < 7)   (goal LDL is <100) need 30-50% reduction from baseline     BP Readings from Last 3 Encounters:   07/28/23 100/70   07/03/23 130/76   05/04/23 (!) 140/80    (goal /80)      All Future Testing planned in [facility]:  Lab Frequency Next Occurrence   NM MYOCARDIAL SPECT REST EXERCISE OR RX Once 02/09/2023   TSH with Reflex Once 05/04/2024   Comprehensive Metabolic Panel Once 05/04/2024   CBC with Auto Differential Once 05/04/2024   Lipid Panel Once 05/04/2024   Magnesium Once 05/04/2024   Vitamin D 25 Hydroxy Once 05/04/2024   EKG 12 Lead Once 05/04/2024   XR CHEST (2 VW) Once 05/04/2024       Next Visit Date:  Future Appointments   Date Time Provider Department Center   4/29/2024  9:00 AM Zachary Gonsalez MD [facility]            Patient Active Problem List:

## 2024-01-11 NOTE — TELEPHONE ENCOUNTER
Last OV: 7/28/2023  FALL 05/04/23 NP   Last RX:    Next scheduled apt: Visit date not found          Surescript requesting a refill

## 2024-01-21 ENCOUNTER — TELEPHONE (OUTPATIENT)
Dept: FAMILY MEDICINE CLINIC | Age: 54
End: 2024-01-21

## 2024-01-22 RX ORDER — LISINOPRIL AND HYDROCHLOROTHIAZIDE 20; 12.5 MG/1; MG/1
1 TABLET ORAL
Qty: 30 TABLET | Refills: 0 | OUTPATIENT
Start: 2024-01-22

## 2024-01-22 NOTE — TELEPHONE ENCOUNTER
Last OV 07/28/23    Requesting refill on lisinopril-hydrochlorothiazide thru surescripts.  Rx pending      Next OV not scheduled

## 2024-01-25 ENCOUNTER — OFFICE VISIT (OUTPATIENT)
Dept: FAMILY MEDICINE CLINIC | Age: 54
End: 2024-01-25
Payer: MEDICAID

## 2024-01-25 ENCOUNTER — TELEPHONE (OUTPATIENT)
Dept: FAMILY MEDICINE CLINIC | Age: 54
End: 2024-01-25

## 2024-01-25 ENCOUNTER — HOSPITAL ENCOUNTER (OUTPATIENT)
Age: 54
Discharge: HOME | End: 2024-01-25
Payer: MEDICAID

## 2024-01-25 VITALS
WEIGHT: 167 LBS | SYSTOLIC BLOOD PRESSURE: 94 MMHG | HEART RATE: 73 BPM | BODY MASS INDEX: 26.95 KG/M2 | OXYGEN SATURATION: 98 % | DIASTOLIC BLOOD PRESSURE: 60 MMHG

## 2024-01-25 DIAGNOSIS — R68.89 COLD INTOLERANCE: ICD-10-CM

## 2024-01-25 DIAGNOSIS — H60.332 ACUTE SWIMMER'S EAR OF LEFT SIDE: ICD-10-CM

## 2024-01-25 DIAGNOSIS — L85.3 DRY SKIN DERMATITIS: ICD-10-CM

## 2024-01-25 DIAGNOSIS — R68.89 COLD INTOLERANCE: Primary | ICD-10-CM

## 2024-01-25 DIAGNOSIS — H92.02 ACUTE OTALGIA, LEFT: Primary | ICD-10-CM

## 2024-01-25 LAB — TSH SERPL DL<=0.05 MIU/L-ACNC: 1.21 UIU/ML (ref 0.3–5)

## 2024-01-25 PROCEDURE — G8427 DOCREV CUR MEDS BY ELIG CLIN: HCPCS | Performed by: STUDENT IN AN ORGANIZED HEALTH CARE EDUCATION/TRAINING PROGRAM

## 2024-01-25 PROCEDURE — 4130F TOPICAL PREP RX AOE: CPT | Performed by: STUDENT IN AN ORGANIZED HEALTH CARE EDUCATION/TRAINING PROGRAM

## 2024-01-25 PROCEDURE — G8419 CALC BMI OUT NRM PARAM NOF/U: HCPCS | Performed by: STUDENT IN AN ORGANIZED HEALTH CARE EDUCATION/TRAINING PROGRAM

## 2024-01-25 PROCEDURE — 4004F PT TOBACCO SCREEN RCVD TLK: CPT | Performed by: STUDENT IN AN ORGANIZED HEALTH CARE EDUCATION/TRAINING PROGRAM

## 2024-01-25 PROCEDURE — 84443 ASSAY THYROID STIM HORMONE: CPT

## 2024-01-25 PROCEDURE — 3017F COLORECTAL CA SCREEN DOC REV: CPT | Performed by: STUDENT IN AN ORGANIZED HEALTH CARE EDUCATION/TRAINING PROGRAM

## 2024-01-25 PROCEDURE — 36415 COLL VENOUS BLD VENIPUNCTURE: CPT

## 2024-01-25 PROCEDURE — 3078F DIAST BP <80 MM HG: CPT | Performed by: STUDENT IN AN ORGANIZED HEALTH CARE EDUCATION/TRAINING PROGRAM

## 2024-01-25 PROCEDURE — 3074F SYST BP LT 130 MM HG: CPT | Performed by: STUDENT IN AN ORGANIZED HEALTH CARE EDUCATION/TRAINING PROGRAM

## 2024-01-25 PROCEDURE — 99213 OFFICE O/P EST LOW 20 MIN: CPT | Performed by: STUDENT IN AN ORGANIZED HEALTH CARE EDUCATION/TRAINING PROGRAM

## 2024-01-25 PROCEDURE — G8484 FLU IMMUNIZE NO ADMIN: HCPCS | Performed by: STUDENT IN AN ORGANIZED HEALTH CARE EDUCATION/TRAINING PROGRAM

## 2024-01-25 RX ORDER — TRIAMCINOLONE ACETONIDE 1 MG/G
CREAM TOPICAL
Qty: 453.6 G | Refills: 0 | Status: SHIPPED | OUTPATIENT
Start: 2024-01-25

## 2024-01-25 ASSESSMENT — PATIENT HEALTH QUESTIONNAIRE - PHQ9
SUM OF ALL RESPONSES TO PHQ QUESTIONS 1-9: 0
SUM OF ALL RESPONSES TO PHQ QUESTIONS 1-9: 0
1. LITTLE INTEREST OR PLEASURE IN DOING THINGS: 0
2. FEELING DOWN, DEPRESSED OR HOPELESS: 0
SUM OF ALL RESPONSES TO PHQ QUESTIONS 1-9: 0
SUM OF ALL RESPONSES TO PHQ9 QUESTIONS 1 & 2: 0
SUM OF ALL RESPONSES TO PHQ QUESTIONS 1-9: 0

## 2024-01-25 ASSESSMENT — ENCOUNTER SYMPTOMS
WHEEZING: 0
ABDOMINAL PAIN: 0
SINUS PAIN: 0
BACK PAIN: 0
NAUSEA: 0
COUGH: 0
DIARRHEA: 0
SORE THROAT: 0
VOMITING: 0

## 2024-01-25 NOTE — PROGRESS NOTES
HPI Notes    Name: Keron Liriano  : 1970         Chief Complaint:     Chief Complaint   Patient presents with    Otalgia     Patient has been experiencing left ear pain    Skin Problem     Patient has itchiness and dry skin on his back on legs. Symptoms started 3 weeks ago.        History of Present Illness:      HPI    This is a 53 year old man presenting for evaluation of left otalga, as well as itchy dry skin on the backs of his legs. He has been putting Jergens lotion twice a day. He is also endorsing cold intolerance over the past month and has been sitting next to a space heater. The ear hurts intermittently throughout the week but he denies bleeding, otorrhea, hearing loss.     Past Medical History:     Past Medical History:   Diagnosis Date    Hyperlipidemia     Hypertension     Insomnia       Reviewed all health maintenance requirements and ordered appropriate tests  Health Maintenance Due   Topic Date Due    Hepatitis B vaccine (1 of 3 - 3-dose series) Never done    HIV screen  Never done    Hepatitis C screen  Never done    Colorectal Cancer Screen  Never done    Pneumococcal 0-64 years Vaccine (2 - PCV) 10/19/2017    Shingles vaccine (1 of 2) Never done    Flu vaccine (1) 2023    COVID-19 Vaccine (4 - - season) 2023    A1C test (Diabetic or Prediabetic)  2023    Lipids  2023       Past Surgical History:     No past surgical history on file.     Medications:       Prior to Admission medications    Medication Sig Start Date End Date Taking? Authorizing Provider   triamcinolone (KENALOG) 0.1 % cream Apply topically 2 times daily. 24  Yes Sumit German DO   neomycin-polymyxin-hydrocortisone (CORTISPORIN) 3.5-89164-4 otic solution Place 4 drops into the left ear 3 times daily for 10 days Instill into left Ear 24 Yes Sumit German DO   lisinopril-hydroCHLOROthiazide (PRINZIDE;ZESTORETIC) 20-12.5 MG per tablet Take 1 tablet by mouth in the

## 2024-01-25 NOTE — TELEPHONE ENCOUNTER
----- Message from Sumit German DO sent at 1/25/2024 12:08 PM EST -----  Normal TSH, I would monitor cold intolerance for a bit longer, see if it improves with the extra moisturizer use for the skin

## 2024-01-25 NOTE — PATIENT INSTRUCTIONS
Mr. Liriano,    Thank you for coming to see me today!    I believe that our main problem is the left ear pain.     Here's what I would recommend we do:    You have a mild infection of this ear, it's called \"swimmer's ear\". I'd like you to put four drops in that ear three times a day for about 10 days. Call if not getting better    We also discussed your skin; the skin is very dry. I'd like you to rub triamcinolone cream on the areas twice a day for a week but no longer. After you rub that in, put Cerave cream on, this will moisturize better than Jergens. You can continue Cerave twice a day for as long as you need to.    Please check a blood test for thyroid today, this may explain your cold intolerance.    Please review the documents I'm attaching related to what we discussed today.    Please give me a call or message me on [employer] if you have any problems or questions, and I will see you at your next visit.    Dr. SANCHEZ

## 2024-02-02 RX ORDER — CIMETIDINE 300 MG/1
TABLET, FILM COATED ORAL
Qty: 120 TABLET | Refills: 5 | Status: SHIPPED | OUTPATIENT
Start: 2024-02-02

## 2024-02-27 RX ORDER — LISINOPRIL AND HYDROCHLOROTHIAZIDE 20; 12.5 MG/1; MG/1
1 TABLET ORAL
Qty: 90 TABLET | Refills: 1 | Status: SHIPPED | OUTPATIENT
Start: 2024-02-27

## 2024-04-18 ENCOUNTER — HOSPITAL ENCOUNTER (OUTPATIENT)
Dept: GENERAL RADIOLOGY | Age: 54
Discharge: HOME | End: 2024-04-20
Payer: MEDICAID

## 2024-04-18 ENCOUNTER — OFFICE VISIT (OUTPATIENT)
Dept: FAMILY MEDICINE CLINIC | Age: 54
End: 2024-04-18
Payer: MEDICAID

## 2024-04-18 ENCOUNTER — HOSPITAL ENCOUNTER (OUTPATIENT)
Age: 54
Discharge: HOME | End: 2024-04-20
Payer: MEDICAID

## 2024-04-18 ENCOUNTER — HOSPITAL ENCOUNTER (OUTPATIENT)
Age: 54
Discharge: HOME | End: 2024-04-18
Payer: MEDICAID

## 2024-04-18 VITALS
SYSTOLIC BLOOD PRESSURE: 98 MMHG | HEART RATE: 71 BPM | DIASTOLIC BLOOD PRESSURE: 72 MMHG | BODY MASS INDEX: 26.36 KG/M2 | OXYGEN SATURATION: 94 % | WEIGHT: 164 LBS | HEIGHT: 66 IN

## 2024-04-18 DIAGNOSIS — I10 ESSENTIAL HYPERTENSION: ICD-10-CM

## 2024-04-18 DIAGNOSIS — E78.2 MIXED HYPERLIPIDEMIA: ICD-10-CM

## 2024-04-18 DIAGNOSIS — F51.01 PRIMARY INSOMNIA: ICD-10-CM

## 2024-04-18 DIAGNOSIS — H69.93 ETD (EUSTACHIAN TUBE DYSFUNCTION), BILATERAL: Primary | ICD-10-CM

## 2024-04-18 DIAGNOSIS — R68.89 COLD INTOLERANCE: ICD-10-CM

## 2024-04-18 DIAGNOSIS — I16.0 HYPERTENSIVE URGENCY: ICD-10-CM

## 2024-04-18 DIAGNOSIS — H73.91 TYMPANIC MEMBRANE DISORDER, RIGHT: ICD-10-CM

## 2024-04-18 DIAGNOSIS — H60.332 ACUTE SWIMMER'S EAR OF LEFT SIDE: ICD-10-CM

## 2024-04-18 LAB
25(OH)D3 SERPL-MCNC: 9.2 NG/ML (ref 30–100)
ALBUMIN SERPL-MCNC: 4.3 G/DL (ref 3.5–5.2)
ALP SERPL-CCNC: 74 U/L (ref 40–129)
ALT SERPL-CCNC: 23 U/L (ref 5–41)
ANION GAP SERPL CALCULATED.3IONS-SCNC: 9 MMOL/L (ref 9–17)
AST SERPL-CCNC: 30 U/L
BASOPHILS # BLD: 0.04 K/UL (ref 0–0.2)
BASOPHILS NFR BLD: 1 % (ref 0–2)
BILIRUB SERPL-MCNC: 0.8 MG/DL (ref 0.3–1.2)
BUN SERPL-MCNC: 4 MG/DL (ref 6–20)
BUN/CREAT SERPL: 8 (ref 9–20)
CALCIUM SERPL-MCNC: 9 MG/DL (ref 8.6–10.4)
CHLORIDE SERPL-SCNC: 89 MMOL/L (ref 98–107)
CHOLEST SERPL-MCNC: 141 MG/DL (ref 0–199)
CHOLESTEROL/HDL RATIO: 2
CO2 SERPL-SCNC: 27 MMOL/L (ref 20–31)
CREAT SERPL-MCNC: 0.5 MG/DL (ref 0.7–1.2)
EOSINOPHIL # BLD: 0.36 K/UL (ref 0–0.4)
EOSINOPHILS RELATIVE PERCENT: 6 % (ref 0–5)
ERYTHROCYTE [DISTWIDTH] IN BLOOD BY AUTOMATED COUNT: 13.1 % (ref 12.1–15.2)
GFR SERPL CREATININE-BSD FRML MDRD: >90 ML/MIN/1.73M2
GLUCOSE SERPL-MCNC: 98 MG/DL (ref 70–99)
HCT VFR BLD AUTO: 40.1 % (ref 41–53)
HDLC SERPL-MCNC: 93 MG/DL
HGB BLD-MCNC: 14.5 G/DL (ref 13.5–17.5)
IMM GRANULOCYTES # BLD AUTO: 0.05 K/UL (ref 0–0.3)
IMM GRANULOCYTES NFR BLD: 1 % (ref 0–5)
LDLC SERPL CALC-MCNC: 40 MG/DL (ref 0–100)
LYMPHOCYTES NFR BLD: 0.68 K/UL (ref 1–4.8)
LYMPHOCYTES RELATIVE PERCENT: 11 % (ref 13–44)
MAGNESIUM SERPL-MCNC: 2.4 MG/DL (ref 1.6–2.6)
MCH RBC QN AUTO: 32 PG (ref 26–34)
MCHC RBC AUTO-ENTMCNC: 36.2 G/DL (ref 31–37)
MCV RBC AUTO: 88.5 FL (ref 80–100)
MONOCYTES NFR BLD: 0.52 K/UL (ref 0–1)
MONOCYTES NFR BLD: 9 % (ref 5–9)
NEUTROPHILS NFR BLD: 72 % (ref 39–75)
NEUTS SEG NFR BLD: 4.4 K/UL (ref 2.1–6.5)
PLATELET # BLD AUTO: 129 K/UL (ref 140–450)
PMV BLD AUTO: 8 FL (ref 6–12)
POTASSIUM SERPL-SCNC: 4.3 MMOL/L (ref 3.7–5.3)
PROT SERPL-MCNC: 7.3 G/DL (ref 6.4–8.3)
RBC # BLD AUTO: 4.53 M/UL (ref 4.5–5.9)
SODIUM SERPL-SCNC: 125 MMOL/L (ref 135–144)
TRIGL SERPL-MCNC: 40 MG/DL
TSH SERPL DL<=0.05 MIU/L-ACNC: 1.19 UIU/ML (ref 0.3–5)
VLDLC SERPL CALC-MCNC: 8 MG/DL
WBC OTHER # BLD: 6.1 K/UL (ref 3.5–11)

## 2024-04-18 PROCEDURE — 71046 X-RAY EXAM CHEST 2 VIEWS: CPT

## 2024-04-18 PROCEDURE — G8419 CALC BMI OUT NRM PARAM NOF/U: HCPCS | Performed by: FAMILY MEDICINE

## 2024-04-18 PROCEDURE — 3078F DIAST BP <80 MM HG: CPT | Performed by: FAMILY MEDICINE

## 2024-04-18 PROCEDURE — G8427 DOCREV CUR MEDS BY ELIG CLIN: HCPCS | Performed by: FAMILY MEDICINE

## 2024-04-18 PROCEDURE — 4130F TOPICAL PREP RX AOE: CPT | Performed by: FAMILY MEDICINE

## 2024-04-18 PROCEDURE — 3074F SYST BP LT 130 MM HG: CPT | Performed by: FAMILY MEDICINE

## 2024-04-18 PROCEDURE — 83735 ASSAY OF MAGNESIUM: CPT

## 2024-04-18 PROCEDURE — 4004F PT TOBACCO SCREEN RCVD TLK: CPT | Performed by: FAMILY MEDICINE

## 2024-04-18 PROCEDURE — 80053 COMPREHEN METABOLIC PANEL: CPT

## 2024-04-18 PROCEDURE — 84443 ASSAY THYROID STIM HORMONE: CPT

## 2024-04-18 PROCEDURE — 36415 COLL VENOUS BLD VENIPUNCTURE: CPT

## 2024-04-18 PROCEDURE — 3017F COLORECTAL CA SCREEN DOC REV: CPT | Performed by: FAMILY MEDICINE

## 2024-04-18 PROCEDURE — 85025 COMPLETE CBC W/AUTO DIFF WBC: CPT

## 2024-04-18 PROCEDURE — 80061 LIPID PANEL: CPT

## 2024-04-18 PROCEDURE — 82306 VITAMIN D 25 HYDROXY: CPT

## 2024-04-18 PROCEDURE — 99213 OFFICE O/P EST LOW 20 MIN: CPT | Performed by: FAMILY MEDICINE

## 2024-04-18 RX ORDER — FLUTICASONE PROPIONATE 50 MCG
2 SPRAY, SUSPENSION (ML) NASAL DAILY
Qty: 1 EACH | Refills: 1 | Status: SHIPPED | OUTPATIENT
Start: 2024-04-18

## 2024-04-18 RX ORDER — CIPROFLOXACIN AND DEXAMETHASONE 3; 1 MG/ML; MG/ML
4 SUSPENSION/ DROPS AURICULAR (OTIC) 2 TIMES DAILY
Qty: 5 ML | Refills: 0 | Status: SHIPPED | OUTPATIENT
Start: 2024-04-18 | End: 2024-04-25

## 2024-04-18 SDOH — ECONOMIC STABILITY: FOOD INSECURITY: WITHIN THE PAST 12 MONTHS, YOU WORRIED THAT YOUR FOOD WOULD RUN OUT BEFORE YOU GOT MONEY TO BUY MORE.: NEVER TRUE

## 2024-04-18 SDOH — ECONOMIC STABILITY: INCOME INSECURITY: HOW HARD IS IT FOR YOU TO PAY FOR THE VERY BASICS LIKE FOOD, HOUSING, MEDICAL CARE, AND HEATING?: NOT HARD AT ALL

## 2024-04-18 SDOH — ECONOMIC STABILITY: FOOD INSECURITY: WITHIN THE PAST 12 MONTHS, THE FOOD YOU BOUGHT JUST DIDN'T LAST AND YOU DIDN'T HAVE MONEY TO GET MORE.: NEVER TRUE

## 2024-04-18 ASSESSMENT — PATIENT HEALTH QUESTIONNAIRE - PHQ9
SUM OF ALL RESPONSES TO PHQ9 QUESTIONS 1 & 2: 0
SUM OF ALL RESPONSES TO PHQ QUESTIONS 1-9: 0
SUM OF ALL RESPONSES TO PHQ QUESTIONS 1-9: 0
2. FEELING DOWN, DEPRESSED OR HOPELESS: NOT AT ALL
SUM OF ALL RESPONSES TO PHQ QUESTIONS 1-9: 0
1. LITTLE INTEREST OR PLEASURE IN DOING THINGS: NOT AT ALL
SUM OF ALL RESPONSES TO PHQ QUESTIONS 1-9: 0

## 2024-04-18 NOTE — PROGRESS NOTES
is alert and oriented to person, place, and time.   Psychiatric:         Mood and Affect: Mood normal.         Behavior: Behavior normal.         Assessment & Plan:        Diagnosis Orders   1. ETD (Eustachian tube dysfunction), bilateral        2. Acute swimmer's ear of left side        3. Tympanic membrane disorder, right          1.  Suspect this is the cause of the bulk of patient's symptoms.  Trial of Flonase.  Advised in proper administration technique.  Follow-up 1 month.  2.  Reviewing records further after his visit, I see that this appears to have been more of a chronic issue.  Treating with Ciprodex and again rechecking in 1 month.  3.  [place]-shaped defect, difficult to say whether it was a complete perforation.  If it persists he will need ENT referral.  Patient in agreement with plan.    Advised to stop Tagamet.  My suspicion is that he had been on either Zantac or Pepcid, and then when Zantac was discontinued a few years ago and Pepcid was on a subsequent shortage, he was probably placed on the Tagamet.  Go ahead and stop the med, and if any particular symptoms seem to emerge, we will treat as appropriate.      Requested Prescriptions     Signed Prescriptions Disp Refills    fluticasone (FLONASE) 50 MCG/ACT nasal spray 1 each 1     Si sprays by Each Nostril route daily    ciprofloxacin-dexAMETHasone (CIPRODEX) 0.3-0.1 % otic suspension 5 mL 0     Sig: Place 4 drops into the left ear 2 times daily for 7 days         signed by Marge Valencia DO on 2024 at 8:40 AM  [address] [address]  Dept: [phone]

## 2024-04-18 NOTE — PATIENT INSTRUCTIONS
[employer] SURVEY:    You may be receiving a survey from [employer] regarding your visit today.    You may get this in the mail, through your MyChart or in your email.     Please complete the survey to enable us to provide the highest quality of care to you and your family.    If you cannot score us as very good ( 5 Stars) on any question, please feel free to call the office to discuss how we could have made your experience exceptional.     Thank you.    Clinical Care Team:   DO Jak Martinez CMA                                     Triage: Charissa Connelly CMA              Clerical Team:    Charissa Ledezma     [place] [phone]           Billing questions: [phone]           Medical Records Request: [phone]

## 2024-04-19 ENCOUNTER — TELEPHONE (OUTPATIENT)
Dept: FAMILY MEDICINE CLINIC | Age: 54
End: 2024-04-19

## 2024-04-19 DIAGNOSIS — E55.9 VITAMIN D DEFICIENCY: Primary | ICD-10-CM

## 2024-04-19 DIAGNOSIS — E87.1 HYPONATREMIA: ICD-10-CM

## 2024-04-19 RX ORDER — ERGOCALCIFEROL 1.25 MG/1
50000 CAPSULE ORAL WEEKLY
Qty: 12 CAPSULE | Refills: 1 | Status: SHIPPED | OUTPATIENT
Start: 2024-04-19

## 2024-04-19 RX ORDER — LISINOPRIL 20 MG/1
20 TABLET ORAL DAILY
Qty: 30 TABLET | Refills: 5 | Status: SHIPPED | OUTPATIENT
Start: 2024-04-19

## 2024-04-19 NOTE — TELEPHONE ENCOUNTER
Reviewed patient's blood work, sodium level very low which is probably from part of his bp med, the lisinopril HCTZ combo, so stop that med and start plain lisinopril.  Rx sent to [facility].  Vitamin D very low also so I want him to start a weekly Rx supplement.  Rx sent.  Please have BMP drawn the day he comes in for his visit with Dr. Gonsalez.  In 3 months have vit d and bmp checked again. All orders placed. Thanks.

## 2024-04-19 NOTE — TELEPHONE ENCOUNTER
Pt aware of results and medication change, along with BMP recheck. No questions or concerns. Pt repeated the plan back and confirmed understanding

## 2024-04-19 NOTE — TELEPHONE ENCOUNTER
----- Message from Sumit German, DO sent at 4/18/2024  3:29 PM EDT -----  Hello!  It looks like Mr. Liriano is now your patient?  I had seen him for some time and just had his lab studies from Dr. Matos's sent to me.  He is pretty vitamin D deficient and hyponatremic (likely from his HCTZ) I was going to suggest treating him with the 50,000 international unit vitamin D dose and either scaling back or discontinuing his hydrochlorothiazide in favor of a different antihypertensive, would you like me to take care of this, or would you rather since you are listed as his PCP?  It does not matter to me, I just want to be helpful!  Jarocho

## 2024-04-21 LAB
EKG ATRIAL RATE: 67 BPM
EKG P AXIS: 42 DEGREES
EKG P-R INTERVAL: 164 MS
EKG Q-T INTERVAL: 412 MS
EKG QRS DURATION: 94 MS
EKG QTC CALCULATION (BAZETT): 435 MS
EKG R AXIS: 30 DEGREES
EKG T AXIS: 43 DEGREES
EKG VENTRICULAR RATE: 67 BPM

## 2024-04-26 DIAGNOSIS — G47.00 INSOMNIA, UNSPECIFIED TYPE: ICD-10-CM

## 2024-04-26 RX ORDER — HYDROXYZINE 50 MG/1
50 TABLET, FILM COATED ORAL
Qty: 30 TABLET | Refills: 5 | Status: SHIPPED | OUTPATIENT
Start: 2024-04-26

## 2024-04-26 NOTE — TELEPHONE ENCOUNTER
Last visit:  4/18/2024  Next Visit Date:    Future Appointments   Date Time Provider Department Center   4/29/2024  9:00 AM Zachary Gonsalez MD [facility]   5/17/2024  8:00 AM Marge Valencia DO [facility]         Medication List:  Prior to Admission medications    Medication Sig Start Date End Date Taking? Authorizing Provider   lisinopril (PRINIVIL;ZESTRIL) 20 MG tablet Take 1 tablet by mouth daily 4/19/24   Marge Valencia DO   vitamin D (ERGOCALCIFEROL) 1.25 MG (62309 UT) CAPS capsule Take 1 capsule by mouth once a week 4/19/24   Marge Valencia DO   fluticasone (FLONASE) 50 MCG/ACT nasal spray 2 sprays by Each Nostril route daily 4/18/24   Marge Valencia DO   triamcinolone (KENALOG) 0.1 % cream Apply topically 2 times daily. 1/25/24   Sumit German DO   doxazosin (CARDURA) 2 MG tablet Take 1 tablet by mouth daily 1/11/24   Sumit German DO   atorvastatin (LIPITOR) 10 MG tablet Take 1 tablet by mouth at bedtime 1/11/24   Sumit German DO   furosemide (LASIX) 20 MG tablet take 1 tablet by mouth once daily if needed 12/19/23   Sumit German DO   metoprolol tartrate (LOPRESSOR) 25 MG tablet take 1 tablet by mouth twice a day 12/14/23   Sumit German DO   hydrOXYzine HCl (ATARAX) 50 MG tablet take 1 tablet by mouth at bedtime 11/20/23   Sumit German DO   cetirizine (ZYRTEC) 10 MG tablet Take 1 tablet by mouth daily 7/3/23   Starla Vann MD   aspirin 81 MG EC tablet Take 1 tablet by mouth daily    Starla Vann MD

## 2024-04-29 ENCOUNTER — OFFICE VISIT (OUTPATIENT)
Dept: CARDIOLOGY CLINIC | Age: 54
End: 2024-04-29
Payer: MEDICAID

## 2024-04-29 ENCOUNTER — HOSPITAL ENCOUNTER (OUTPATIENT)
Age: 54
Discharge: HOME | End: 2024-04-29
Payer: MEDICAID

## 2024-04-29 VITALS
OXYGEN SATURATION: 96 % | DIASTOLIC BLOOD PRESSURE: 70 MMHG | SYSTOLIC BLOOD PRESSURE: 120 MMHG | BODY MASS INDEX: 26.81 KG/M2 | HEART RATE: 92 BPM | WEIGHT: 166 LBS

## 2024-04-29 DIAGNOSIS — E55.9 VITAMIN D DEFICIENCY DISEASE: ICD-10-CM

## 2024-04-29 DIAGNOSIS — E78.2 MIXED HYPERLIPIDEMIA: ICD-10-CM

## 2024-04-29 DIAGNOSIS — I10 ESSENTIAL HYPERTENSION: Primary | ICD-10-CM

## 2024-04-29 DIAGNOSIS — I10 ESSENTIAL HYPERTENSION: ICD-10-CM

## 2024-04-29 LAB
ANION GAP SERPL CALCULATED.3IONS-SCNC: 11 MMOL/L (ref 9–17)
BUN SERPL-MCNC: 3 MG/DL (ref 6–20)
BUN/CREAT SERPL: 5 (ref 9–20)
CALCIUM SERPL-MCNC: 8.9 MG/DL (ref 8.6–10.4)
CHLORIDE SERPL-SCNC: 102 MMOL/L (ref 98–107)
CO2 SERPL-SCNC: 21 MMOL/L (ref 20–31)
CREAT SERPL-MCNC: 0.6 MG/DL (ref 0.7–1.2)
GFR SERPL CREATININE-BSD FRML MDRD: >90 ML/MIN/1.73M2
GLUCOSE SERPL-MCNC: 113 MG/DL (ref 70–99)
POTASSIUM SERPL-SCNC: 4.1 MMOL/L (ref 3.7–5.3)
SODIUM SERPL-SCNC: 134 MMOL/L (ref 135–144)

## 2024-04-29 PROCEDURE — G8419 CALC BMI OUT NRM PARAM NOF/U: HCPCS | Performed by: INTERNAL MEDICINE

## 2024-04-29 PROCEDURE — 3017F COLORECTAL CA SCREEN DOC REV: CPT | Performed by: INTERNAL MEDICINE

## 2024-04-29 PROCEDURE — 99214 OFFICE O/P EST MOD 30 MIN: CPT | Performed by: INTERNAL MEDICINE

## 2024-04-29 PROCEDURE — 36415 COLL VENOUS BLD VENIPUNCTURE: CPT

## 2024-04-29 PROCEDURE — G8427 DOCREV CUR MEDS BY ELIG CLIN: HCPCS | Performed by: INTERNAL MEDICINE

## 2024-04-29 PROCEDURE — 80048 BASIC METABOLIC PNL TOTAL CA: CPT

## 2024-04-29 PROCEDURE — 3074F SYST BP LT 130 MM HG: CPT | Performed by: INTERNAL MEDICINE

## 2024-04-29 PROCEDURE — 3078F DIAST BP <80 MM HG: CPT | Performed by: INTERNAL MEDICINE

## 2024-04-29 PROCEDURE — 4004F PT TOBACCO SCREEN RCVD TLK: CPT | Performed by: INTERNAL MEDICINE

## 2024-04-29 RX ORDER — LISINOPRIL 20 MG/1
20 TABLET ORAL 2 TIMES DAILY
Qty: 60 TABLET | Refills: 11 | Status: SHIPPED | OUTPATIENT
Start: 2024-04-29

## 2024-04-29 RX ORDER — LISINOPRIL 20 MG/1
20 TABLET ORAL 2 TIMES DAILY
COMMUNITY
End: 2024-04-29 | Stop reason: SDUPTHER

## 2024-04-29 NOTE — PROGRESS NOTES
Ov Dr. Gonsalez for one year follow up   Pt verbalized meds- did not have   List or bottles   No hospitalizations/procedures/er visits  No chest pain   No palpitations   No sob  No dizziness  Saw Dr. Valencia last week   She changed Lisinopril hctz 20/12.5 to   Reg lisinopril d/t low sodium   But not bp running high again  Requesting to go back to the   Lisinopril/hctz as bp were good then  Also Dr. Valencia added Vit D   50,000/weekly PER PT   Pt checking bp daily     Will recheck BMP today     Increase Lisinopril 20 mg to   1 tablet am and 1 tablet in evening     Monitor BP/HR daily - different times of the day -  Report readings back to us in 2 weeks    Follow up in one year

## 2024-04-29 NOTE — PATIENT INSTRUCTIONS
Increase Lisinopril 20 mg to   1 tablet am and 1 tablet in evening     Monitor BP/HR daily - different times of the day -  Report readings back to us in 2 weeks    Follow up in one year

## 2024-05-06 NOTE — PROGRESS NOTES
Mahin Gonsalez M.D.  [facility]  [facility]  [address] [place] [address]  [phone]        2024        Marge Valencia,   [address] [id number]     RE:  KERON SANDERS   :  1970    Dear Dr. Valencia:    CHIEF COMPLAINT:  Hypertension.    HISTORY OF PRESENT ILLNESS:  I had the pleasure of seeing Keron Sanders in our office on 2024.  He is a very pleasant 53-year-old gentleman who was hospitalized on 2023, for hypertensive urgency.  He had a stress test, which was normal and an echocardiogram at that time showed an EF of 60%.    He has had no hospitalizations or procedures.  He denies any exertional chest pain or chest discomfort or any palpitations.  No unusual shortness of breath.  Energy level is good.    He saw Dr. Valencia last week.  In preparation for this visit, he had blood work done.  His sodium was 125.  He saw Dr. Valencia last week, and she stopped his lisinopril/hydrochlorothiazide 20/12.5 b.i.d. and changed to lisinopril 20 mg daily.    He has been taking his blood pressure at home since that time, and he states his blood pressure is often in the 140 to 160 range.  He is requesting to go back on lisinopril/hydrochlorothiazide.  His vitamin D was very low, and she also added vitamin D 50,000 units weekly.    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  His mother also lives with him, which is also stressful.    He has never had a myocardial infarction or cardiac catheterization.    CARDIAC RISK FACTORS:  Hyperlipidemia:  Positive.  Smoking:  Positive.  Hypertension:  Positive.  Other family members:  Positive.  Peripheral vascular disease:  Negative.  Diabetes:  Positive.    MEDICATIONS:  At this time, he is on aspirin 81 mg daily, Lipitor 10 mg daily, Lasix 20 mg p.r.n., lisinopril 20 mg daily, Lopressor 25 mg b.i.d., Cardura 2 mg daily.    PAST MEDICAL AND SURGICAL

## 2024-05-14 ENCOUNTER — TELEPHONE (OUTPATIENT)
Dept: CARDIOLOGY CLINIC | Age: 54
End: 2024-05-14

## 2024-05-14 RX ORDER — DOXAZOSIN 2 MG/1
2 TABLET ORAL 2 TIMES DAILY
Qty: 60 TABLET | Refills: 11 | Status: SHIPPED | OUTPATIENT
Start: 2024-05-14 | End: 2024-05-17 | Stop reason: SDUPTHER

## 2024-05-17 ENCOUNTER — OFFICE VISIT (OUTPATIENT)
Dept: FAMILY MEDICINE CLINIC | Age: 54
End: 2024-05-17
Payer: MEDICAID

## 2024-05-17 VITALS
HEART RATE: 74 BPM | WEIGHT: 163 LBS | SYSTOLIC BLOOD PRESSURE: 110 MMHG | OXYGEN SATURATION: 96 % | BODY MASS INDEX: 26.2 KG/M2 | HEIGHT: 66 IN | DIASTOLIC BLOOD PRESSURE: 74 MMHG

## 2024-05-17 DIAGNOSIS — I10 ESSENTIAL HYPERTENSION: ICD-10-CM

## 2024-05-17 DIAGNOSIS — H69.93 ETD (EUSTACHIAN TUBE DYSFUNCTION), BILATERAL: Primary | ICD-10-CM

## 2024-05-17 PROCEDURE — 3017F COLORECTAL CA SCREEN DOC REV: CPT | Performed by: FAMILY MEDICINE

## 2024-05-17 PROCEDURE — G8419 CALC BMI OUT NRM PARAM NOF/U: HCPCS | Performed by: FAMILY MEDICINE

## 2024-05-17 PROCEDURE — G8427 DOCREV CUR MEDS BY ELIG CLIN: HCPCS | Performed by: FAMILY MEDICINE

## 2024-05-17 PROCEDURE — 4004F PT TOBACCO SCREEN RCVD TLK: CPT | Performed by: FAMILY MEDICINE

## 2024-05-17 PROCEDURE — 99213 OFFICE O/P EST LOW 20 MIN: CPT | Performed by: FAMILY MEDICINE

## 2024-05-17 PROCEDURE — 3078F DIAST BP <80 MM HG: CPT | Performed by: FAMILY MEDICINE

## 2024-05-17 PROCEDURE — 3074F SYST BP LT 130 MM HG: CPT | Performed by: FAMILY MEDICINE

## 2024-05-17 RX ORDER — DOXAZOSIN 2 MG/1
2 TABLET ORAL 2 TIMES DAILY
Qty: 60 TABLET | Refills: 11 | Status: SHIPPED | OUTPATIENT
Start: 2024-05-17

## 2024-05-17 NOTE — PROGRESS NOTES
made your experience exceptional.     Thank you.    Clinical Care Team:   DO Jak Martinez CMA                                     Triage: Charissa Connelly CMA              Clerical Team:    Charissa Ledezma     [place] [phone]           Billing questions: [phone]           Medical Records Request: [phone]           signed by Marge Valencia DO on 2024 at 10:38 PM  [address] [address]  Dept: [phone]

## 2024-05-17 NOTE — PATIENT INSTRUCTIONS
Press [employer]aney SURVEY:    You may be receiving a survey from [employer] regarding your visit today.    You may get this in the mail, through your MyChart or in your email.     Please complete the survey to enable us to provide the highest quality of care to you and your family.    If you cannot score us as very good ( 5 Stars) on any question, please feel free to call the office to discuss how we could have made your experience exceptional.     Thank you.    Clinical Care Team:   DO Jak Martinez CMA                                     Triage: Charissa Connelly CMA              Clerical Team:    Charissa Ledezma     [place] [phone]           Billing questions: [phone]           Medical Records Request: [phone]

## 2024-06-21 RX ORDER — FLUTICASONE PROPIONATE 50 MCG
2 SPRAY, SUSPENSION (ML) NASAL DAILY PRN
Qty: 16 G | Refills: 5 | Status: SHIPPED | OUTPATIENT
Start: 2024-06-21

## 2024-06-21 NOTE — TELEPHONE ENCOUNTER
Last visit:  5/17/2024  Next Visit Date:    Future Appointments   Date Time Provider Department [place]   8/21/2024  8:40 AM Marge Valencia DO [facility]   4/28/2025  9:00 AM Zachary Gonsalez MD [facility]         Medication List:  Prior to Admission medications    Medication Sig Start Date End Date Taking? Authorizing Provider   metoprolol tartrate (LOPRESSOR) 25 MG tablet take 1 tablet by mouth twice a day 5/28/24   Marge Valencia DO   doxazosin (CARDURA) 2 MG tablet Take 1 tablet by mouth in the morning and 1 tablet in the evening. 5/17/24   Marge Valencia DO   lisinopril (PRINIVIL;ZESTRIL) 20 MG tablet Take 1 tablet by mouth in the morning and at bedtime 4/29/24   Zachary Gonsalez MD   hydrOXYzine HCl (ATARAX) 50 MG tablet take 1 tablet by mouth at bedtime  Patient taking differently: Take 1 tablet by mouth at bedtime as needed 4/26/24   Marge Valencia DO   vitamin D (ERGOCALCIFEROL) 1.25 MG (26369 UT) CAPS capsule Take 1 capsule by mouth once a week 4/19/24   Marge Valencia DO   fluticasone (FLONASE) 50 MCG/ACT nasal spray 2 sprays by Each Nostril route daily  Patient taking differently: 2 sprays by Each Nostril route daily as needed 4/18/24   Marge Valencia DO   triamcinolone (KENALOG) 0.1 % cream Apply topically 2 times daily.  Patient taking differently: Apply topically 2 times daily. prn 1/25/24   Sumit German DO   atorvastatin (LIPITOR) 10 MG tablet Take 1 tablet by mouth at bedtime 1/11/24   Sumit German DO   furosemide (LASIX) 20 MG tablet take 1 tablet by mouth once daily if needed 12/19/23   Sumit German DO   cetirizine (ZYRTEC) 10 MG tablet Take 1 tablet by mouth daily as needed 7/3/23   Starla Vann MD   aspirin 81 MG EC tablet Take 1 tablet by mouth daily    Starla Vann MD

## 2024-06-26 RX ORDER — FUROSEMIDE 20 MG/1
20 TABLET ORAL DAILY PRN
Qty: 90 TABLET | Refills: 1 | Status: SHIPPED | OUTPATIENT
Start: 2024-06-26

## 2024-06-26 NOTE — TELEPHONE ENCOUNTER
Last OV: 5/17/2024 HTN   Last RX:    Next scheduled apt: 8/21/2024  3 MONTHS HTN         Surescript requesting a refill

## 2024-07-29 RX ORDER — CIMETIDINE 300 MG
TABLET ORAL
Qty: 120 TABLET | Refills: 5 | OUTPATIENT
Start: 2024-07-29

## 2024-08-06 RX ORDER — ATORVASTATIN CALCIUM 10 MG/1
10 TABLET, FILM COATED ORAL NIGHTLY
Qty: 90 TABLET | Refills: 1 | Status: SHIPPED | OUTPATIENT
Start: 2024-08-06

## 2024-08-06 NOTE — TELEPHONE ENCOUNTER
Last OV: 5/17/2024 04/29/24 HLD   Last RX:    Next scheduled apt: 8/21/2024   3 months HTN           Surescript requesting a refill   Medication pending for approval

## 2024-08-07 RX ORDER — CIMETIDINE 300 MG
TABLET ORAL
Qty: 120 TABLET | Refills: 5 | OUTPATIENT
Start: 2024-08-07

## 2024-10-01 DIAGNOSIS — G47.00 INSOMNIA, UNSPECIFIED TYPE: ICD-10-CM

## 2024-10-01 RX ORDER — HYDROXYZINE HYDROCHLORIDE 50 MG/1
50 TABLET, FILM COATED ORAL
Qty: 30 TABLET | Refills: 5 | Status: SHIPPED | OUTPATIENT
Start: 2024-10-01

## 2024-10-01 NOTE — TELEPHONE ENCOUNTER
Last OV: 5/17/2024 HTN ETD     02/15/24 insomnia   Last RX:    Next scheduled apt: Visit date not found            Surescript requesting a refill   Medication pending for approval

## 2024-10-23 RX ORDER — METOPROLOL TARTRATE 25 MG/1
25 TABLET, FILM COATED ORAL 2 TIMES DAILY
Qty: 180 TABLET | Refills: 3 | Status: SHIPPED | OUTPATIENT
Start: 2024-10-23

## 2024-10-23 NOTE — TELEPHONE ENCOUNTER
Rx refill request via [employer]  Metoprolol 25mg bid  Last OV for HTN 5-17-24  Next appt- none

## 2024-11-25 RX ORDER — FLUTICASONE PROPIONATE 50 MCG
SPRAY, SUSPENSION (ML) NASAL
Qty: 1 EACH | Refills: 5 | Status: SHIPPED | OUTPATIENT
Start: 2024-11-25

## 2024-11-25 NOTE — TELEPHONE ENCOUNTER
Last visit:  5/17/2024  Next Visit Date:    Future Appointments   Date Time Provider Department Center   4/28/2025  9:00 AM Zachary Gonsalez MD [facility]         Medication List:  Prior to Admission medications    Medication Sig Start Date End Date Taking? Authorizing Provider   metoprolol tartrate (LOPRESSOR) 25 MG tablet TAKE 1 TABLET BY MOUTH TWICE DAILY 10/23/24   Marge Valencia DO   hydrOXYzine HCl (ATARAX) 50 MG tablet TAKE 1 TABLET BY MOUTH AT BEDTIME 10/1/24   Marge Valencia DO   atorvastatin (LIPITOR) 10 MG tablet take 1 tablet by mouth at bedtime 8/6/24   Marge Valencia DO   furosemide (LASIX) 20 MG tablet take 1 tablet by mouth once daily if needed 6/26/24   Marge Valencia DO   fluticasone (FLONASE) 50 MCG/ACT nasal spray 2 sprays by Each Nostril route daily as needed for Rhinitis or Allergies 6/21/24   Marge Valencia DO   doxazosin (CARDURA) 2 MG tablet Take 1 tablet by mouth in the morning and 1 tablet in the evening. 5/17/24   Marge Valencia DO   lisinopril (PRINIVIL;ZESTRIL) 20 MG tablet Take 1 tablet by mouth in the morning and at bedtime 4/29/24   Zachary Gonsalez MD   vitamin D (ERGOCALCIFEROL) 1.25 MG (16439 UT) CAPS capsule Take 1 capsule by mouth once a week 4/19/24   Marge Valencia DO   triamcinolone (KENALOG) 0.1 % cream Apply topically 2 times daily.  Patient taking differently: Apply topically 2 times daily. prn 1/25/24   Sumit German DO   cetirizine (ZYRTEC) 10 MG tablet Take 1 tablet by mouth daily as needed 7/3/23   Starla Vann MD   aspirin 81 MG EC tablet Take 1 tablet by mouth daily    Starla Vann MD

## 2024-12-02 RX ORDER — FUROSEMIDE 20 MG/1
TABLET ORAL
Qty: 90 TABLET | Refills: 0 | Status: SHIPPED | OUTPATIENT
Start: 2024-12-02

## 2024-12-02 NOTE — TELEPHONE ENCOUNTER
Last visit:  5/17/2024  Next Visit Date:    Future Appointments   Date Time Provider Department Center   4/28/2025  9:00 AM Zachary Gonsalez MD [facility]         Medication List:  Prior to Admission medications    Medication Sig Start Date End Date Taking? Authorizing Provider   fluticasone (FLONASE) 50 MCG/ACT nasal spray USE 2 SPRAYS IN EACH NOSTRIL ONCE A DAY AS NEEDED for Rhinitis or allergies 11/25/24   Marge Valencia DO   metoprolol tartrate (LOPRESSOR) 25 MG tablet TAKE 1 TABLET BY MOUTH TWICE DAILY 10/23/24   Marge Valencia DO   hydrOXYzine HCl (ATARAX) 50 MG tablet TAKE 1 TABLET BY MOUTH AT BEDTIME 10/1/24   Marge Valencia DO   atorvastatin (LIPITOR) 10 MG tablet take 1 tablet by mouth at bedtime 8/6/24   Marge Valencia DO   furosemide (LASIX) 20 MG tablet take 1 tablet by mouth once daily if needed 6/26/24   Marge Valencia DO   doxazosin (CARDURA) 2 MG tablet Take 1 tablet by mouth in the morning and 1 tablet in the evening. 5/17/24   Marge Valencia DO   lisinopril (PRINIVIL;ZESTRIL) 20 MG tablet Take 1 tablet by mouth in the morning and at bedtime 4/29/24   Zachary Gonsalez MD   vitamin D (ERGOCALCIFEROL) 1.25 MG (16937 UT) CAPS capsule Take 1 capsule by mouth once a week 4/19/24   Marge Valencia DO   triamcinolone (KENALOG) 0.1 % cream Apply topically 2 times daily.  Patient taking differently: Apply topically 2 times daily. prn 1/25/24   Sumit German DO   cetirizine (ZYRTEC) 10 MG tablet Take 1 tablet by mouth daily as needed 7/3/23   Starla Vann MD   aspirin 81 MG EC tablet Take 1 tablet by mouth daily    Starla Vann MD

## 2025-01-14 ENCOUNTER — TELEPHONE (OUTPATIENT)
Dept: FAMILY MEDICINE CLINIC | Age: 55
End: 2025-01-14

## 2025-01-14 DIAGNOSIS — E87.1 HYPONATREMIA: Primary | ICD-10-CM

## 2025-01-14 DIAGNOSIS — E55.9 VITAMIN D DEFICIENCY: ICD-10-CM

## 2025-01-15 NOTE — TELEPHONE ENCOUNTER
Ordered, have done in next couple wks and make f/u with me please  
Wife notified  
CHEST (2 VW) Once 04/29/2025       Next Visit Date:  Future Appointments   Date Time Provider Department Center   4/28/2025  9:00 AM Zachary Gonsalez MD [facility]            Patient Active Problem List:     Essential hypertension     Mixed hyperlipidemia     Primary insomnia     Bilateral lower extremity edema     Elevated LFTs     Hypertensive urgency

## 2025-01-27 ENCOUNTER — OFFICE VISIT (OUTPATIENT)
Dept: FAMILY MEDICINE CLINIC | Age: 55
End: 2025-01-27
Payer: MEDICAID

## 2025-01-27 ENCOUNTER — HOSPITAL ENCOUNTER (OUTPATIENT)
Age: 55
Discharge: HOME | End: 2025-01-27
Payer: MEDICAID

## 2025-01-27 VITALS
HEART RATE: 66 BPM | DIASTOLIC BLOOD PRESSURE: 90 MMHG | SYSTOLIC BLOOD PRESSURE: 160 MMHG | OXYGEN SATURATION: 98 % | BODY MASS INDEX: 26.68 KG/M2 | HEIGHT: 66 IN | WEIGHT: 166 LBS

## 2025-01-27 DIAGNOSIS — E87.1 HYPONATREMIA: ICD-10-CM

## 2025-01-27 DIAGNOSIS — I10 ESSENTIAL HYPERTENSION: Primary | ICD-10-CM

## 2025-01-27 DIAGNOSIS — E55.9 VITAMIN D DEFICIENCY: ICD-10-CM

## 2025-01-27 DIAGNOSIS — R42 DIZZINESS: ICD-10-CM

## 2025-01-27 LAB
25(OH)D3 SERPL-MCNC: 18.4 NG/ML (ref 30–100)
ANION GAP SERPL CALCULATED.3IONS-SCNC: 7 MMOL/L (ref 9–17)
BUN SERPL-MCNC: 4 MG/DL (ref 6–20)
BUN/CREAT SERPL: 7 (ref 9–20)
CALCIUM SERPL-MCNC: 8.8 MG/DL (ref 8.6–10.4)
CHLORIDE SERPL-SCNC: 95 MMOL/L (ref 98–107)
CO2 SERPL-SCNC: 28 MMOL/L (ref 20–31)
CREAT SERPL-MCNC: 0.6 MG/DL (ref 0.7–1.2)
GFR, ESTIMATED: >90 ML/MIN/1.73M2
GLUCOSE SERPL-MCNC: 92 MG/DL (ref 70–99)
POTASSIUM SERPL-SCNC: 4.2 MMOL/L (ref 3.7–5.3)
SODIUM SERPL-SCNC: 130 MMOL/L (ref 135–144)

## 2025-01-27 PROCEDURE — 3080F DIAST BP >= 90 MM HG: CPT | Performed by: NURSE PRACTITIONER

## 2025-01-27 PROCEDURE — G8427 DOCREV CUR MEDS BY ELIG CLIN: HCPCS | Performed by: NURSE PRACTITIONER

## 2025-01-27 PROCEDURE — 3077F SYST BP >= 140 MM HG: CPT | Performed by: NURSE PRACTITIONER

## 2025-01-27 PROCEDURE — 36415 COLL VENOUS BLD VENIPUNCTURE: CPT

## 2025-01-27 PROCEDURE — 82306 VITAMIN D 25 HYDROXY: CPT

## 2025-01-27 PROCEDURE — 99213 OFFICE O/P EST LOW 20 MIN: CPT | Performed by: NURSE PRACTITIONER

## 2025-01-27 PROCEDURE — 80048 BASIC METABOLIC PNL TOTAL CA: CPT

## 2025-01-27 PROCEDURE — G8419 CALC BMI OUT NRM PARAM NOF/U: HCPCS | Performed by: NURSE PRACTITIONER

## 2025-01-27 PROCEDURE — 3017F COLORECTAL CA SCREEN DOC REV: CPT | Performed by: NURSE PRACTITIONER

## 2025-01-27 PROCEDURE — 4004F PT TOBACCO SCREEN RCVD TLK: CPT | Performed by: NURSE PRACTITIONER

## 2025-01-27 RX ORDER — AMLODIPINE BESYLATE 5 MG/1
5 TABLET ORAL DAILY
Qty: 30 TABLET | Refills: 0 | Status: SHIPPED | OUTPATIENT
Start: 2025-01-27

## 2025-01-27 ASSESSMENT — ENCOUNTER SYMPTOMS
VOMITING: 0
COUGH: 0
SHORTNESS OF BREATH: 0
DIARRHEA: 0
NAUSEA: 0

## 2025-01-27 NOTE — PROGRESS NOTES
vomiting.   Neurological:  Negative for dizziness, seizures and headaches.         Physical Exam:     Vitals:  BP (!) 160/90 (Site: Left Upper Arm, Position: Sitting, Cuff Size: Medium Adult)   Pulse 66   Ht 1.676 m (5' 5.98\")   Wt 75.3 kg (166 lb)   SpO2 98%   BMI 26.81 kg/m²       Physical Exam  Vitals and nursing note reviewed.   Constitutional:       Appearance: He is well-developed.   Cardiovascular:      Rate and Rhythm: Normal rate and regular rhythm.      Heart sounds: S1 normal and S2 normal.   Pulmonary:      Effort: Pulmonary effort is normal. No respiratory distress.      Breath sounds: Normal breath sounds.   Abdominal:      General: Bowel sounds are normal.      Palpations: Abdomen is soft.      Tenderness: There is no abdominal tenderness.   Skin:     General: Skin is warm and dry.   Psychiatric:         Behavior: Behavior is cooperative.               Data:     Lab Results   Component Value Date/Time     01/27/2025 08:47 AM    K 4.2 01/27/2025 08:47 AM    CL 95 01/27/2025 08:47 AM    CO2 28 01/27/2025 08:47 AM    BUN 4 01/27/2025 08:47 AM    CREATININE 0.6 01/27/2025 08:47 AM    GLUCOSE 92 01/27/2025 08:47 AM    BILITOT 0.8 04/18/2024 09:06 AM    ALKPHOS 74 04/18/2024 09:06 AM    AST 30 04/18/2024 09:06 AM    ALT 23 04/18/2024 09:06 AM     Lab Results   Component Value Date/Time    WBC 6.1 04/18/2024 09:06 AM    RBC 4.53 04/18/2024 09:06 AM    HGB 14.5 04/18/2024 09:06 AM    HCT 40.1 04/18/2024 09:06 AM    MCV 88.5 04/18/2024 09:06 AM    MCH 32.0 04/18/2024 09:06 AM    MCHC 36.2 04/18/2024 09:06 AM    RDW 13.1 04/18/2024 09:06 AM     04/18/2024 09:06 AM    MPV 8.0 04/18/2024 09:06 AM     Lab Results   Component Value Date/Time    TSH 1.19 04/18/2024 09:06 AM     Lab Results   Component Value Date/Time    CHOL 141 04/18/2024 09:06 AM    LDL 40 04/18/2024 09:06 AM    HDL 93 04/18/2024 09:06 AM    LABA1C 5.8 12/14/2022 09:26 AM          Assessment & Plan        Diagnosis Orders   1.

## 2025-01-31 ENCOUNTER — TELEPHONE (OUTPATIENT)
Dept: FAMILY MEDICINE CLINIC | Age: 55
End: 2025-01-31

## 2025-01-31 DIAGNOSIS — E87.1 HYPONATREMIA: Primary | ICD-10-CM

## 2025-01-31 RX ORDER — ERGOCALCIFEROL 1.25 MG/1
50000 CAPSULE, LIQUID FILLED ORAL WEEKLY
Qty: 12 CAPSULE | Refills: 3 | Status: SHIPPED | OUTPATIENT
Start: 2025-01-31

## 2025-01-31 NOTE — TELEPHONE ENCOUNTER
----- Message from Dr. Marge Valencia, DO sent at 1/29/2025 12:01 PM EST -----  Sodium level back down. Still better than it had been when he was on water pill. Would actually recommend seeing nephro for this - kidney specialist but they manage electrolyte abnormalities.  Vit D improving a little, still quite low - continue the 50k units once a week for another 3 mos and then recheck.

## 2025-02-11 ENCOUNTER — OFFICE VISIT (OUTPATIENT)
Dept: FAMILY MEDICINE CLINIC | Age: 55
End: 2025-02-11
Payer: MEDICAID

## 2025-02-11 VITALS — SYSTOLIC BLOOD PRESSURE: 116 MMHG | OXYGEN SATURATION: 96 % | DIASTOLIC BLOOD PRESSURE: 80 MMHG | HEART RATE: 83 BPM

## 2025-02-11 DIAGNOSIS — R42 DIZZINESS: ICD-10-CM

## 2025-02-11 DIAGNOSIS — I10 ESSENTIAL HYPERTENSION: Primary | ICD-10-CM

## 2025-02-11 PROCEDURE — 4004F PT TOBACCO SCREEN RCVD TLK: CPT | Performed by: NURSE PRACTITIONER

## 2025-02-11 PROCEDURE — 99213 OFFICE O/P EST LOW 20 MIN: CPT | Performed by: NURSE PRACTITIONER

## 2025-02-11 PROCEDURE — G8427 DOCREV CUR MEDS BY ELIG CLIN: HCPCS | Performed by: NURSE PRACTITIONER

## 2025-02-11 PROCEDURE — G8419 CALC BMI OUT NRM PARAM NOF/U: HCPCS | Performed by: NURSE PRACTITIONER

## 2025-02-11 PROCEDURE — 3074F SYST BP LT 130 MM HG: CPT | Performed by: NURSE PRACTITIONER

## 2025-02-11 PROCEDURE — 3017F COLORECTAL CA SCREEN DOC REV: CPT | Performed by: NURSE PRACTITIONER

## 2025-02-11 PROCEDURE — 3079F DIAST BP 80-89 MM HG: CPT | Performed by: NURSE PRACTITIONER

## 2025-02-11 RX ORDER — AMLODIPINE BESYLATE 5 MG/1
5 TABLET ORAL DAILY
Qty: 90 TABLET | Refills: 1 | Status: SHIPPED | OUTPATIENT
Start: 2025-02-11

## 2025-02-11 SDOH — ECONOMIC STABILITY: FOOD INSECURITY: WITHIN THE PAST 12 MONTHS, THE FOOD YOU BOUGHT JUST DIDN'T LAST AND YOU DIDN'T HAVE MONEY TO GET MORE.: NEVER TRUE

## 2025-02-11 SDOH — ECONOMIC STABILITY: FOOD INSECURITY: WITHIN THE PAST 12 MONTHS, YOU WORRIED THAT YOUR FOOD WOULD RUN OUT BEFORE YOU GOT MONEY TO BUY MORE.: NEVER TRUE

## 2025-02-11 ASSESSMENT — ENCOUNTER SYMPTOMS
DIARRHEA: 0
VOMITING: 0
SHORTNESS OF BREATH: 0
NAUSEA: 0
COUGH: 0

## 2025-02-11 NOTE — PROGRESS NOTES
HPI Notes    Name: Keron Lirinao  : 1970         Chief Complaint:     Chief Complaint   Patient presents with    Hypertension     2 week check up  Amlodipine 5mg QD was added at last visit  Metoprolol 25mg bid and lisinopril 20mg bid       History of Present Illness:        HPI  Pt is a 53 yo male who presents for follow up. Pt has been treated for several years for primary HTN. Recently BP had trended upwards without anything significantly changing. Pt was started on norvasc 5mg daily, in addition to his previously ordered cardura, lisinopril, lopressor.    Past Medical History:     Past Medical History:   Diagnosis Date    Hyperlipidemia     Hypertension     Insomnia       Reviewed all health maintenance requirements and ordered appropriate tests  Health Maintenance Due   Topic Date Due    HIV screen  Never done    Hepatitis C screen  Never done    Hepatitis B vaccine (1 of 3 - 19+ 3-dose series) Never done    Colorectal Cancer Screen  Never done    Pneumococcal 50+ years Vaccine (2 of 2 - PCV) 10/19/2017    Shingles vaccine (1 of 2) Never done    A1C test (Diabetic or Prediabetic)  2023    Flu vaccine (1) 2024    COVID-19 Vaccine (2024- season) 2024       Past Surgical History:     No past surgical history on file.     Medications:       Prior to Admission medications    Medication Sig Start Date End Date Taking? Authorizing Provider   amLODIPine (NORVASC) 5 MG tablet Take 1 tablet by mouth daily 25  Yes Rohith Lopez DNP   vitamin D (ERGOCALCIFEROL) 1.25 MG (19259 UT) CAPS capsule Take 1 capsule by mouth once a week 25  Yes Marge Valencia DO   furosemide (LASIX) 20 MG tablet TAKE 1 TABLET BY MOUTH ONCE DAILY AS NEEDED 24  Yes Marge Valencia DO   fluticasone (FLONASE) 50 MCG/ACT nasal spray USE 2 SPRAYS IN EACH NOSTRIL ONCE A DAY AS NEEDED for Rhinitis or allergies 24  Yes Marge Valencia DO   metoprolol tartrate (LOPRESSOR) 25 MG tablet

## 2025-02-12 ENCOUNTER — NURSE ONLY (OUTPATIENT)
Dept: FAMILY MEDICINE CLINIC | Age: 55
End: 2025-02-12
Payer: MEDICAID

## 2025-02-12 DIAGNOSIS — R73.09 ELEVATED GLUCOSE: Primary | ICD-10-CM

## 2025-02-12 LAB — HBA1C MFR BLD: 5.6 %

## 2025-02-12 PROCEDURE — 83036 HEMOGLOBIN GLYCOSYLATED A1C: CPT | Performed by: FAMILY MEDICINE

## 2025-04-07 RX ORDER — ATORVASTATIN CALCIUM 10 MG/1
10 TABLET, FILM COATED ORAL NIGHTLY
Qty: 90 TABLET | Refills: 3 | Status: SHIPPED | OUTPATIENT
Start: 2025-04-07

## 2025-04-24 ENCOUNTER — HOSPITAL ENCOUNTER (OUTPATIENT)
Dept: GENERAL RADIOLOGY | Age: 55
Discharge: HOME | End: 2025-04-26
Payer: MEDICAID

## 2025-04-24 ENCOUNTER — HOSPITAL ENCOUNTER (OUTPATIENT)
Age: 55
Discharge: HOME | End: 2025-04-24
Payer: MEDICAID

## 2025-04-24 ENCOUNTER — HOSPITAL ENCOUNTER (OUTPATIENT)
Dept: NON INVASIVE DIAGNOSTICS | Age: 55
Discharge: HOME | End: 2025-04-24
Payer: MEDICAID

## 2025-04-24 DIAGNOSIS — E55.9 VITAMIN D DEFICIENCY DISEASE: ICD-10-CM

## 2025-04-24 DIAGNOSIS — E78.2 MIXED HYPERLIPIDEMIA: ICD-10-CM

## 2025-04-24 DIAGNOSIS — I10 ESSENTIAL HYPERTENSION: ICD-10-CM

## 2025-04-24 LAB
25(OH)D3 SERPL-MCNC: 39 NG/ML (ref 30–100)
ALBUMIN SERPL-MCNC: 4.5 G/DL (ref 3.5–5.2)
ALBUMIN/GLOB SERPL: 2 {RATIO} (ref 1–2.5)
ALP SERPL-CCNC: 74 U/L (ref 40–129)
ALT SERPL-CCNC: 27 U/L (ref 5–41)
ANION GAP SERPL CALCULATED.3IONS-SCNC: 11 MMOL/L (ref 9–17)
AST SERPL-CCNC: 33 U/L
BASOPHILS # BLD: 0.05 K/UL (ref 0–0.2)
BASOPHILS NFR BLD: 1 % (ref 0–2)
BILIRUB SERPL-MCNC: 0.7 MG/DL (ref 0.3–1.2)
BUN SERPL-MCNC: 5 MG/DL (ref 6–20)
CALCIUM SERPL-MCNC: 9.3 MG/DL (ref 8.6–10.4)
CHLORIDE SERPL-SCNC: 97 MMOL/L (ref 98–107)
CHOLEST SERPL-MCNC: 145 MG/DL (ref 0–199)
CHOLESTEROL/HDL RATIO: 2
CO2 SERPL-SCNC: 23 MMOL/L (ref 20–31)
CREAT SERPL-MCNC: 0.6 MG/DL (ref 0.7–1.2)
EOSINOPHIL # BLD: 0.6 K/UL (ref 0–0.4)
EOSINOPHILS RELATIVE PERCENT: 11 % (ref 0–5)
ERYTHROCYTE [DISTWIDTH] IN BLOOD BY AUTOMATED COUNT: 12.7 % (ref 12.1–15.2)
GFR, ESTIMATED: >90 ML/MIN/1.73M2
GLUCOSE SERPL-MCNC: 92 MG/DL (ref 70–99)
HCT VFR BLD AUTO: 43 % (ref 41–53)
HDLC SERPL-MCNC: 71 MG/DL
HGB BLD-MCNC: 15 G/DL (ref 13.5–17.5)
IMM GRANULOCYTES # BLD AUTO: 0.02 K/UL (ref 0–0.3)
IMM GRANULOCYTES NFR BLD: 0 % (ref 0–5)
LDLC SERPL CALC-MCNC: 66 MG/DL (ref 0–100)
LYMPHOCYTES NFR BLD: 0.99 K/UL (ref 1–4.8)
LYMPHOCYTES RELATIVE PERCENT: 17 % (ref 13–44)
MAGNESIUM SERPL-MCNC: 2.2 MG/DL (ref 1.6–2.6)
MCH RBC QN AUTO: 31.8 PG (ref 26–34)
MCHC RBC AUTO-ENTMCNC: 34.9 G/DL (ref 31–37)
MCV RBC AUTO: 91.3 FL (ref 80–100)
MONOCYTES NFR BLD: 0.51 K/UL (ref 0–1)
MONOCYTES NFR BLD: 9 % (ref 5–9)
NEUTROPHILS NFR BLD: 62 % (ref 39–75)
NEUTS SEG NFR BLD: 3.54 K/UL (ref 2.1–6.5)
PLATELET # BLD AUTO: 131 K/UL (ref 140–450)
PMV BLD AUTO: 8.1 FL (ref 6–12)
POTASSIUM SERPL-SCNC: 4.4 MMOL/L (ref 3.7–5.3)
PROT SERPL-MCNC: 6.8 G/DL (ref 6.4–8.3)
RBC # BLD AUTO: 4.71 M/UL (ref 4.5–5.9)
SODIUM SERPL-SCNC: 131 MMOL/L (ref 135–144)
TRIGL SERPL-MCNC: 41 MG/DL
TSH SERPL DL<=0.05 MIU/L-ACNC: 1.41 UIU/ML (ref 0.27–4.2)
VLDLC SERPL CALC-MCNC: 8 MG/DL (ref 1–30)
WBC OTHER # BLD: 5.7 K/UL (ref 3.5–11)

## 2025-04-24 PROCEDURE — 84443 ASSAY THYROID STIM HORMONE: CPT

## 2025-04-24 PROCEDURE — 80061 LIPID PANEL: CPT

## 2025-04-24 PROCEDURE — 83735 ASSAY OF MAGNESIUM: CPT

## 2025-04-24 PROCEDURE — 80053 COMPREHEN METABOLIC PANEL: CPT

## 2025-04-24 PROCEDURE — 71046 X-RAY EXAM CHEST 2 VIEWS: CPT

## 2025-04-24 PROCEDURE — 36415 COLL VENOUS BLD VENIPUNCTURE: CPT

## 2025-04-24 PROCEDURE — 82306 VITAMIN D 25 HYDROXY: CPT

## 2025-04-24 PROCEDURE — 85025 COMPLETE CBC W/AUTO DIFF WBC: CPT

## 2025-04-25 LAB
EKG ATRIAL RATE: 67 BPM
EKG P AXIS: 49 DEGREES
EKG P-R INTERVAL: 166 MS
EKG Q-T INTERVAL: 404 MS
EKG QRS DURATION: 90 MS
EKG QTC CALCULATION (BAZETT): 426 MS
EKG R AXIS: 35 DEGREES
EKG T AXIS: 26 DEGREES
EKG VENTRICULAR RATE: 67 BPM

## 2025-04-28 ENCOUNTER — OFFICE VISIT (OUTPATIENT)
Dept: CARDIOLOGY CLINIC | Age: 55
End: 2025-04-28
Payer: MEDICAID

## 2025-04-28 VITALS
WEIGHT: 160 LBS | SYSTOLIC BLOOD PRESSURE: 108 MMHG | DIASTOLIC BLOOD PRESSURE: 70 MMHG | HEART RATE: 76 BPM | RESPIRATION RATE: 16 BRPM | OXYGEN SATURATION: 94 % | BODY MASS INDEX: 25.84 KG/M2

## 2025-04-28 DIAGNOSIS — E87.1 HYPONATREMIA: ICD-10-CM

## 2025-04-28 DIAGNOSIS — I10 ESSENTIAL HYPERTENSION: Primary | ICD-10-CM

## 2025-04-28 DIAGNOSIS — E78.2 MIXED HYPERLIPIDEMIA: ICD-10-CM

## 2025-04-28 DIAGNOSIS — E55.9 VITAMIN D DEFICIENCY DISEASE: ICD-10-CM

## 2025-04-28 PROCEDURE — 4004F PT TOBACCO SCREEN RCVD TLK: CPT | Performed by: NURSE PRACTITIONER

## 2025-04-28 PROCEDURE — 99214 OFFICE O/P EST MOD 30 MIN: CPT | Performed by: NURSE PRACTITIONER

## 2025-04-28 PROCEDURE — 3017F COLORECTAL CA SCREEN DOC REV: CPT | Performed by: NURSE PRACTITIONER

## 2025-04-28 PROCEDURE — 3078F DIAST BP <80 MM HG: CPT | Performed by: NURSE PRACTITIONER

## 2025-04-28 PROCEDURE — 3074F SYST BP LT 130 MM HG: CPT | Performed by: NURSE PRACTITIONER

## 2025-04-28 PROCEDURE — G8427 DOCREV CUR MEDS BY ELIG CLIN: HCPCS | Performed by: NURSE PRACTITIONER

## 2025-04-28 PROCEDURE — G8419 CALC BMI OUT NRM PARAM NOF/U: HCPCS | Performed by: NURSE PRACTITIONER

## 2025-04-28 NOTE — PROGRESS NOTES
[facility]  [address] [place] [address]  Phone: [phone], Fax: [phone]     Patient: Keron Liriano  : 1970  Date of Visit: 2025    REASON FOR VISIT / CONSULTATION: Hypertension (1 yr fu)      History of Present Illness:        Dear Marge Valencia DO    We had the pleasure of seeing Keron Liriano in our office today. Mr. Liriano is a pleasant 54 y.o. male with a history of hypertension, hyperlipidemia, insomnia and elevated LFT's.    On 2023, he had a stress test, which was normal and an echocardiogram at that time showed an EF of 60%.     He has had no hospitalizations, ER visits or procedures since last seen.        He has been taking his blood pressure at home and he states his blood pressure is good, often in the 120 to 130 range since Dr. Valencia started amlodipine.    He reports that he has lost over 50 pounds over 2 1/2 to 3 year period.  He is feeling better and is conscientious about his heart health with both his parents having heart disease.    He does take his Lasix as needed once or twice a week for swelling in his ankles.    He has never had a myocardial infarction or cardiac catheterization.     Keron Liriano has no abdominal pain, nausea or vomiting at this time.   He has had no PND, orthopnea or pedal edema.   He is walking well with no unusual shortness of breath.  He has had no dizziness, lightheadedness or fainting spells.  He has had no change in energy level.    Mr. Liriano denies any chest pain now or in the recent past, chest discomfort, palpitations, increased shortness of breath or loss of energy.  No syncope or near syncope.  No problems with his medications or any other concerns at this time.      CARDIAC RISK FACTORS:  Hyperlipidemia:  Positive.  Smoking:  Positive.  Hypertension:  Positive.  Other family members:  Positive.  Peripheral vascular disease:  Negative.  Diabetes:  Positive - Diet

## 2025-04-28 NOTE — PROGRESS NOTES
Ov with Rajni for one year follow up  No chest pain   No palpations  No sob  No dizziness   Edema \"once in while\"   Lasix 1-2 week per pt   No new issues   BP good control per pt

## 2025-05-01 RX ORDER — LISINOPRIL 20 MG/1
TABLET ORAL
Qty: 60 TABLET | Refills: 8 | Status: SHIPPED | OUTPATIENT
Start: 2025-05-01

## 2025-05-21 RX ORDER — DOXAZOSIN 2 MG/1
TABLET ORAL
Qty: 60 TABLET | Refills: 11 | Status: SHIPPED | OUTPATIENT
Start: 2025-05-21

## 2025-05-21 RX ORDER — FLUTICASONE PROPIONATE 50 MCG
SPRAY, SUSPENSION (ML) NASAL
Qty: 16 G | Refills: 5 | Status: SHIPPED | OUTPATIENT
Start: 2025-05-21

## 2025-05-21 NOTE — TELEPHONE ENCOUNTER
Last visit:  2/11/2025  Next Visit Date:    Future Appointments   Date Time Provider Department Center   4/27/2026  8:30 AM Tamia Jackson APRN - [facility]         Medication List:  Prior to Admission medications    Medication Sig Start Date End Date Taking? Authorizing Provider   doxazosin (CARDURA) 2 MG tablet TAKE 1 TABLET BY MOUTH TWICE DAILY IN THE MORNING and IN THE EVENING 5/21/25   Zachary Gonsalez MD   lisinopril (PRINIVIL;ZESTRIL) 20 MG tablet TAKE 1 TABLET BY MOUTH TWICE DAILY IN THE MORNING and AT BEDTIME 5/1/25   Zachary Gonsalez MD   atorvastatin (LIPITOR) 10 MG tablet TAKE 1 TABLET BY MOUTH AT BEDTIME 4/7/25   Marge Valencia DO   amLODIPine (NORVASC) 5 MG tablet Take 1 tablet by mouth daily 2/11/25   Rohith Lopez DNP   vitamin D (ERGOCALCIFEROL) 1.25 MG (20361 UT) CAPS capsule Take 1 capsule by mouth once a week 1/31/25   Marge Valencia DO   furosemide (LASIX) 20 MG tablet TAKE 1 TABLET BY MOUTH ONCE DAILY AS NEEDED 12/2/24   Marge Valencia DO   fluticasone (FLONASE) 50 MCG/ACT nasal spray USE 2 SPRAYS IN EACH NOSTRIL ONCE A DAY AS NEEDED for Rhinitis or allergies 11/25/24   Marge Valencia DO   metoprolol tartrate (LOPRESSOR) 25 MG tablet TAKE 1 TABLET BY MOUTH TWICE DAILY 10/23/24   Marge Valencia DO   hydrOXYzine HCl (ATARAX) 50 MG tablet TAKE 1 TABLET BY MOUTH AT BEDTIME 10/1/24   Marge Valencia DO   cetirizine (ZYRTEC) 10 MG tablet Take 1 tablet by mouth daily as needed 7/3/23   Starla Vann MD   aspirin 81 MG EC tablet Take 1 tablet by mouth daily    Starla Vann MD

## 2025-05-27 DIAGNOSIS — G47.00 INSOMNIA, UNSPECIFIED TYPE: ICD-10-CM

## 2025-05-27 RX ORDER — HYDROXYZINE HYDROCHLORIDE 50 MG/1
50 TABLET, FILM COATED ORAL
Qty: 30 TABLET | Refills: 5 | Status: SHIPPED | OUTPATIENT
Start: 2025-05-27

## 2025-05-27 NOTE — TELEPHONE ENCOUNTER
Last visit:  2/11/2025  Next Visit Date:    Future Appointments   Date Time Provider Department Center   4/27/2026  8:30 AM Tamia Jackson APRN - [facility]         Medication List:  Prior to Admission medications    Medication Sig Start Date End Date Taking? Authorizing Provider   fluticasone (FLONASE) 50 MCG/ACT nasal spray use 2 (TWO) sprays in each nostril once a day as needed for rhinitis or allergies 5/21/25   Marge Valencia DO   doxazosin (CARDURA) 2 MG tablet TAKE 1 TABLET BY MOUTH TWICE DAILY IN THE MORNING and IN THE EVENING 5/21/25   Zacahry Gonsalez MD   lisinopril (PRINIVIL;ZESTRIL) 20 MG tablet TAKE 1 TABLET BY MOUTH TWICE DAILY IN THE MORNING and AT BEDTIME 5/1/25   Zachary Gonsalez MD   atorvastatin (LIPITOR) 10 MG tablet TAKE 1 TABLET BY MOUTH AT BEDTIME 4/7/25   Marge Valencia DO   amLODIPine (NORVASC) 5 MG tablet Take 1 tablet by mouth daily 2/11/25   Rohith Lopez DNP   vitamin D (ERGOCALCIFEROL) 1.25 MG (55596 UT) CAPS capsule Take 1 capsule by mouth once a week 1/31/25   Marge Valencia DO   furosemide (LASIX) 20 MG tablet TAKE 1 TABLET BY MOUTH ONCE DAILY AS NEEDED 12/2/24   Marge Valencia DO   metoprolol tartrate (LOPRESSOR) 25 MG tablet TAKE 1 TABLET BY MOUTH TWICE DAILY 10/23/24   Marge Valencia DO   hydrOXYzine HCl (ATARAX) 50 MG tablet TAKE 1 TABLET BY MOUTH AT BEDTIME 10/1/24   Marge Valencia DO   cetirizine (ZYRTEC) 10 MG tablet Take 1 tablet by mouth daily as needed 7/3/23   Starla Vann MD   aspirin 81 MG EC tablet Take 1 tablet by mouth daily    Starla Vann MD

## 2025-06-20 ENCOUNTER — OFFICE VISIT (OUTPATIENT)
Dept: FAMILY MEDICINE CLINIC | Age: 55
End: 2025-06-20

## 2025-06-20 VITALS
HEIGHT: 66 IN | WEIGHT: 163.4 LBS | SYSTOLIC BLOOD PRESSURE: 114 MMHG | HEART RATE: 68 BPM | BODY MASS INDEX: 26.26 KG/M2 | DIASTOLIC BLOOD PRESSURE: 70 MMHG | OXYGEN SATURATION: 98 %

## 2025-06-20 DIAGNOSIS — L02.214 ABSCESS OF GROIN, LEFT: ICD-10-CM

## 2025-06-20 DIAGNOSIS — L02.411 ABSCESS OF AXILLA, RIGHT: Primary | ICD-10-CM

## 2025-06-20 RX ORDER — CEPHALEXIN 500 MG/1
500 CAPSULE ORAL 2 TIMES DAILY
Qty: 14 CAPSULE | Refills: 0 | Status: SHIPPED | OUTPATIENT
Start: 2025-06-20 | End: 2025-06-27

## 2025-06-20 ASSESSMENT — ENCOUNTER SYMPTOMS
NAUSEA: 0
DIARRHEA: 0
SHORTNESS OF BREATH: 0
VOMITING: 0
COUGH: 0

## 2025-06-20 ASSESSMENT — PATIENT HEALTH QUESTIONNAIRE - PHQ9
1. LITTLE INTEREST OR PLEASURE IN DOING THINGS: NOT AT ALL
SUM OF ALL RESPONSES TO PHQ QUESTIONS 1-9: 0
2. FEELING DOWN, DEPRESSED OR HOPELESS: NOT AT ALL
SUM OF ALL RESPONSES TO PHQ QUESTIONS 1-9: 0

## 2025-06-20 NOTE — PROGRESS NOTES
Diagnosis Orders   1. Abscess of axilla, right        2. Abscess of groin, left            Patient was taken to the procedure room and made comfortable. Pt educated about potential risks and consent signed. Time out procedure conducted. Area cleansed with Betadine.  The center of the area was cleared of Betadine with alcohol.  1.0 mL of Xylocaine 1% was instilled into the subcutaneous tissue. Pt verbalized numbness to area. Sterile drape applied and sterile gloves applied. I used a #11 blade to incise approx 0.75cm transversally through the fluctuant area of the right axilla. Immediate return of milky white fluid. Moderate pressure applied with further return of milky white fluid. Cavity was explored with curved forceps with pieces of the wall membrane removed. Wound coated with triple antibiotic ointment. Area covered with large band aide.    Time out procedure conducted. Area cleansed with Betadine.  The center of the area was cleared of Betadine with alcohol.  1.5 mL of Xylocaine 1% was instilled into the subcutaneous tissue of the left inguinal fold. Pt verbalized numbness to area. Sterile drape applied and sterile gloves applied. I used a #11 blade to incise approx 1.0cm transversally through the fluctuant area of the left inguinal fold. Immediate return of milky white fluid. Moderate pressure applied with further return of milky white fluid. Cavity was explored with curved forceps with pieces of the wall membrane removed. Area covered with 4x4 guaze and secured. Pt tolerated procedure well. No complaints or complications.    Will start on keflex. Change bandages daily.               Completed Refills   Requested Prescriptions      No prescriptions requested or ordered in this encounter     Return in about 1 week (around 6/27/2025) for Recheck abscess.  No orders of the defined types were placed in this encounter.    No orders of the defined types were placed in this encounter.        Patient

## 2025-06-27 ENCOUNTER — OFFICE VISIT (OUTPATIENT)
Dept: FAMILY MEDICINE CLINIC | Age: 55
End: 2025-06-27
Payer: MEDICAID

## 2025-06-27 VITALS
DIASTOLIC BLOOD PRESSURE: 76 MMHG | WEIGHT: 161 LBS | HEART RATE: 66 BPM | OXYGEN SATURATION: 97 % | HEIGHT: 66 IN | BODY MASS INDEX: 25.88 KG/M2 | SYSTOLIC BLOOD PRESSURE: 122 MMHG

## 2025-06-27 DIAGNOSIS — N52.1 ERECTILE DYSFUNCTION DUE TO DISEASES CLASSIFIED ELSEWHERE: ICD-10-CM

## 2025-06-27 DIAGNOSIS — L02.411 ABSCESS OF AXILLA, RIGHT: Primary | ICD-10-CM

## 2025-06-27 DIAGNOSIS — L02.214 ABSCESS OF GROIN, LEFT: ICD-10-CM

## 2025-06-27 PROCEDURE — 3078F DIAST BP <80 MM HG: CPT | Performed by: NURSE PRACTITIONER

## 2025-06-27 PROCEDURE — 3074F SYST BP LT 130 MM HG: CPT | Performed by: NURSE PRACTITIONER

## 2025-06-27 PROCEDURE — G8419 CALC BMI OUT NRM PARAM NOF/U: HCPCS | Performed by: NURSE PRACTITIONER

## 2025-06-27 PROCEDURE — 3017F COLORECTAL CA SCREEN DOC REV: CPT | Performed by: NURSE PRACTITIONER

## 2025-06-27 PROCEDURE — 99213 OFFICE O/P EST LOW 20 MIN: CPT | Performed by: NURSE PRACTITIONER

## 2025-06-27 PROCEDURE — G8427 DOCREV CUR MEDS BY ELIG CLIN: HCPCS | Performed by: NURSE PRACTITIONER

## 2025-06-27 PROCEDURE — 4004F PT TOBACCO SCREEN RCVD TLK: CPT | Performed by: NURSE PRACTITIONER

## 2025-06-27 RX ORDER — TADALAFIL 5 MG/1
5 TABLET ORAL DAILY
Qty: 90 TABLET | Refills: 0 | Status: SHIPPED | OUTPATIENT
Start: 2025-06-27

## 2025-06-27 ASSESSMENT — PATIENT HEALTH QUESTIONNAIRE - PHQ9
2. FEELING DOWN, DEPRESSED OR HOPELESS: NOT AT ALL
SUM OF ALL RESPONSES TO PHQ QUESTIONS 1-9: 0
1. LITTLE INTEREST OR PLEASURE IN DOING THINGS: NOT AT ALL
SUM OF ALL RESPONSES TO PHQ QUESTIONS 1-9: 0

## 2025-06-27 ASSESSMENT — ENCOUNTER SYMPTOMS
SHORTNESS OF BREATH: 0
COUGH: 0
NAUSEA: 0
VOMITING: 0
DIARRHEA: 0

## 2025-06-27 NOTE — PATIENT INSTRUCTIONS
SURVEY:    You may be receiving a survey from [facility] regarding your visit today.    You may get this in the mail, through your MyChart or in your email.     Please complete the survey to enable us to provide the highest quality of care to you and your family.      Thank you.    Clinical Care Team:         GEOVANNY Olmedo-DEXTER Latham                         Triage:         DEXTER Daley             Clerical Team:        Charissa Ledezma       [place] [phone]           Billing questions: [phone]           Medical Records Request: [phone]

## 2025-06-27 NOTE — PROGRESS NOTES
HPI Notes    Name: Keron Liriano  : 1970         Chief Complaint:     Chief Complaint   Patient presents with    Wound Check     1 week.        History of Present Illness:        HPI  Pt is a 55 yo male who presents for wound check. On 25, pt had an abscess to right axilla and left groin drained. Pt states that his groin abscess is still \"leaking a little\". Denies pain, but both areas are tender to palpation. Denies any fevers.     Complains of erectile dysfunction for the past 2 years. Takes 1 shot of liquor daily, but denies other alcohol use. Denies increased stress. Has not taken any medication for this problem. Does take multiple BP medications.     Past Medical History:     Past Medical History:   Diagnosis Date    Hyperlipidemia     Hypertension     Insomnia       Reviewed all health maintenance requirements and ordered appropriate tests  Health Maintenance Due   Topic Date Due    HIV screen  Never done    Hepatitis C screen  Never done    Hepatitis B vaccine (1 of 3 - 19+ 3-dose series) Never done    Colorectal Cancer Screen  Never done    Pneumococcal 50+ years Vaccine (2 of 2 - PCV) 10/19/2017    Shingles vaccine (1 of 2) Never done    COVID-19 Vaccine (2024- season) 2024       Past Surgical History:     No past surgical history on file.     Medications:       Prior to Admission medications    Medication Sig Start Date End Date Taking? Authorizing Provider   tadalafil (CIALIS) 5 MG tablet Take 1 tablet by mouth daily 25  Yes Rohith Lopez DNP   hydrOXYzine HCl (ATARAX) 50 MG tablet TAKE 1 TABLET BY MOUTH AT BEDTIME 25  Yes Marge Valencia DO   fluticasone (FLONASE) 50 MCG/ACT nasal spray use 2 (TWO) sprays in each nostril once a day as needed for rhinitis or allergies 25  Yes Marge Valencia DO   doxazosin (CARDURA) 2 MG tablet TAKE 1 TABLET BY MOUTH TWICE DAILY IN THE MORNING and IN THE EVENING 25  Yes Zachary Gonsalez MD   lisinopril

## 2025-07-21 RX ORDER — AMLODIPINE BESYLATE 5 MG/1
5 TABLET ORAL DAILY
Qty: 90 TABLET | Refills: 1 | Status: SHIPPED | OUTPATIENT
Start: 2025-07-21

## 2025-07-21 RX ORDER — FUROSEMIDE 20 MG/1
TABLET ORAL
Qty: 90 TABLET | Refills: 0 | Status: SHIPPED | OUTPATIENT
Start: 2025-07-21

## 2025-07-21 NOTE — TELEPHONE ENCOUNTER
Last visit:  6/27/2025  Next Visit Date:    Future Appointments   Date Time Provider Department Center   4/27/2026  8:30 AM Tamia Jackson APRN - [facility]         Medication List:  Prior to Admission medications    Medication Sig Start Date End Date Taking? Authorizing Provider   tadalafil (CIALIS) 5 MG tablet Take 1 tablet by mouth daily 6/27/25   Rohith Lopez DNP   hydrOXYzine HCl (ATARAX) 50 MG tablet TAKE 1 TABLET BY MOUTH AT BEDTIME 5/27/25   Marge Valencia DO   fluticasone (FLONASE) 50 MCG/ACT nasal spray use 2 (TWO) sprays in each nostril once a day as needed for rhinitis or allergies 5/21/25   Marge Valencia DO   doxazosin (CARDURA) 2 MG tablet TAKE 1 TABLET BY MOUTH TWICE DAILY IN THE MORNING and IN THE EVENING 5/21/25   Zachary Gonsalez MD   lisinopril (PRINIVIL;ZESTRIL) 20 MG tablet TAKE 1 TABLET BY MOUTH TWICE DAILY IN THE MORNING and AT BEDTIME 5/1/25   Zachary Gonsalez MD   atorvastatin (LIPITOR) 10 MG tablet TAKE 1 TABLET BY MOUTH AT BEDTIME 4/7/25   Marge Valencia DO   amLODIPine (NORVASC) 5 MG tablet Take 1 tablet by mouth daily 2/11/25   Rohith Lopez DNP   vitamin D (ERGOCALCIFEROL) 1.25 MG (41213 UT) CAPS capsule Take 1 capsule by mouth once a week 1/31/25   Marge Valencia DO   furosemide (LASIX) 20 MG tablet TAKE 1 TABLET BY MOUTH ONCE DAILY AS NEEDED 12/2/24   Marge Valencia DO   metoprolol tartrate (LOPRESSOR) 25 MG tablet TAKE 1 TABLET BY MOUTH TWICE DAILY 10/23/24   Marge Valencia DO   cetirizine (ZYRTEC) 10 MG tablet Take 1 tablet by mouth daily as needed 7/3/23   Starla Vann MD   aspirin 81 MG EC tablet Take 1 tablet by mouth daily    Starla Vann MD             Pharmacy is requesting refill of amlodipine. Medication is pending approval.    Admission

## 2025-07-21 NOTE — TELEPHONE ENCOUNTER
Last visit:  6/27/2025  (seen by deepthi for abscess of axilla and groin)   Next Visit Date:    Future Appointments   Date Time Provider Department Center   4/27/2026  8:30 AM Tamia Jackson APRN - [facility]         Medication List:  Prior to Admission medications    Medication Sig Start Date End Date Taking? Authorizing Provider   tadalafil (CIALIS) 5 MG tablet Take 1 tablet by mouth daily 6/27/25   Rohith Lopez DNP   hydrOXYzine HCl (ATARAX) 50 MG tablet TAKE 1 TABLET BY MOUTH AT BEDTIME 5/27/25   Marge Valencia DO   fluticasone (FLONASE) 50 MCG/ACT nasal spray use 2 (TWO) sprays in each nostril once a day as needed for rhinitis or allergies 5/21/25   Marge Valencia DO   doxazosin (CARDURA) 2 MG tablet TAKE 1 TABLET BY MOUTH TWICE DAILY IN THE MORNING and IN THE EVENING 5/21/25   Zachary Gonsalez MD   lisinopril (PRINIVIL;ZESTRIL) 20 MG tablet TAKE 1 TABLET BY MOUTH TWICE DAILY IN THE MORNING and AT BEDTIME 5/1/25   Zachary Gonsalez MD   atorvastatin (LIPITOR) 10 MG tablet TAKE 1 TABLET BY MOUTH AT BEDTIME 4/7/25   Marge Valencia DO   amLODIPine (NORVASC) 5 MG tablet Take 1 tablet by mouth daily 2/11/25   Rohith Lopez DNP   vitamin D (ERGOCALCIFEROL) 1.25 MG (84370 UT) CAPS capsule Take 1 capsule by mouth once a week 1/31/25   Marge Valencia DO   furosemide (LASIX) 20 MG tablet TAKE 1 TABLET BY MOUTH ONCE DAILY AS NEEDED 12/2/24   Marge Valencia DO   metoprolol tartrate (LOPRESSOR) 25 MG tablet TAKE 1 TABLET BY MOUTH TWICE DAILY 10/23/24   Marge Valencia DO   cetirizine (ZYRTEC) 10 MG tablet Take 1 tablet by mouth daily as needed 7/3/23   Starla Vann MD   aspirin 81 MG EC tablet Take 1 tablet by mouth daily    Starla Vann MD       Pharmacy is requesting refill of lasix. Medication is pending approval.

## 2025-08-25 RX ORDER — METOPROLOL TARTRATE 25 MG/1
25 TABLET, FILM COATED ORAL 2 TIMES DAILY
Qty: 180 TABLET | Refills: 3 | Status: SHIPPED | OUTPATIENT
Start: 2025-08-25